# Patient Record
Sex: FEMALE | Race: BLACK OR AFRICAN AMERICAN | Employment: UNEMPLOYED | URBAN - METROPOLITAN AREA
[De-identification: names, ages, dates, MRNs, and addresses within clinical notes are randomized per-mention and may not be internally consistent; named-entity substitution may affect disease eponyms.]

---

## 2017-01-03 ENCOUNTER — PRENATAL OFFICE VISIT (OUTPATIENT)
Dept: OBGYN | Facility: CLINIC | Age: 32
End: 2017-01-03
Payer: COMMERCIAL

## 2017-01-03 VITALS
WEIGHT: 175.5 LBS | SYSTOLIC BLOOD PRESSURE: 100 MMHG | BODY MASS INDEX: 26.6 KG/M2 | DIASTOLIC BLOOD PRESSURE: 70 MMHG | HEIGHT: 68 IN | TEMPERATURE: 97.6 F

## 2017-01-03 DIAGNOSIS — Z30.016 ENCOUNTER FOR INITIAL PRESCRIPTION OF TRANSDERMAL PATCH HORMONAL CONTRACEPTIVE DEVICE: ICD-10-CM

## 2017-01-03 PROCEDURE — 99207 ZZC POST PARTUM EXAM: CPT | Performed by: FAMILY MEDICINE

## 2017-01-03 RX ORDER — NORELGESTROMIN AND ETHINYL ESTRADIOL 35; 150 UG/MG; UG/MG
1 PATCH TRANSDERMAL WEEKLY
Qty: 9 PATCH | Refills: 3 | Status: SHIPPED | OUTPATIENT
Start: 2017-01-03 | End: 2017-03-01

## 2017-01-03 NOTE — PROGRESS NOTES
SUBJECTIVE: Denzel is here for a 6-week postpartum checkup.    Delivery date was 2016. She had a  of a viable girl, weight 7 pounds 15.3 oz., with no complications.  Since delivery, she has been breast feeding.  She has no signs of infection, bleeding or other complications.  She is not pregnant.  We discussed contraceptions and she has chosen unsure.  She  has had intercourse since delivery and complains of No discomfort. Patient screened for postpartum depression and complaints are NEGATIVE. Screening has also been completed for intimate partner violence.    This document serves as a record of the services and decisions personally performed and made by Jessika James DO. It was created on his/her behalf by Ivis Reddy, a trained medical scribe. The creation of this document is based the provider's statements to the medical scribe.  Abbe Reddy 10:36 AM, January 3, 2017      EXAM:  Today's Depression Rating was   PHQ-9 SCORE 1/3/2017   Total Score -   Total Score 0       GENERAL healthy, alert and no distress  RESP: Clear to auscultation  BREASTS: NEGATIVE  CV: NEGATIVE  : NEGATIVE  GYN PELVIC: NEGATIVE, normal external genitalia, normal urethral meatus, normal vaginal mucosa, normal cervix and normal adnexa, no masses or tenderness  PSYCH: NEGATIVE    ASSESSMENT:   Normal postpartum exam after .    PLAN:  1) Rx for ortho evra patch  2) Return as needed or at time of next expected pap, pelvic, or breast exam.    The information in this document, created by the medical scribe for me, accurately reflects the services I personally performed and the decisions made by me. I have reviewed and approved this document for accuracy prior to leaving the patient care area.  Jessika James DO  10:36 AM, 2017    Dr. Jessika James DO    Obstetrics and Gynecology  [facility]

## 2017-01-03 NOTE — PATIENT INSTRUCTIONS
Return as needed.    Dr. Jessika James, DO    Obstetrics and Gynecology  [facility] [place]

## 2017-01-03 NOTE — MR AVS SNAPSHOT
"              After Visit Summary   1/3/2017    Denzel Tripp    MRN: [id number]           Patient Information     Date Of Birth          1985        Visit Information        Provider Department      1/3/2017 10:15 AM Dennis James DO; [employer] [facility]        Today's Diagnoses     Routine postpartum follow-up    -  1     Encounter for initial prescription of transdermal patch hormonal contraceptive device           Care Instructions    Return as needed.    Dr. Dennis James DO    Obstetrics and Gynecology  [facility] [place]               Follow-ups after your visit        Who to contact     If you have questions or need follow up information about today's clinic visit or your schedule please contact [facility] directly at [phone].  Normal or non-critical lab and imaging results will be communicated to you by Pluralityhart, letter or phone within 4 business days after the clinic has received the results. If you do not hear from us within 7 days, please contact the clinic through Pluralityhart or phone. If you have a critical or abnormal lab result, we will notify you by phone as soon as possible.  Submit refill requests through [email or web address] or call your pharmacy and they will forward the refill request to us. Please allow 3 business days for your refill to be completed.          Additional Information About Your Visit        Pluralityhart Information     [email or web address] lets you send messages to your doctor, view your test results, renew your prescriptions, schedule appointments and more. To sign up, go to [email or web address] . Click on \"Log in\" on the left side of the screen, which will take you to the Welcome page. Then click on \"Sign up Now\" on the right side of the page.     You will be asked to enter the access code listed below, as well as some personal information. Please follow the directions to create your username and password.   " "  Your access code is: [email or web address]  Expires: 3/13/2017 11:35 AM     Your access code will  in 90 days. If you need help or a new code, please call your [facility] or [phone].        Care EveryWhere ID     This is your Care EveryWhere ID. This could be used by other organizations to access your [facility] medical records  [id number]        Your Vitals Were     Temperature Height BMI (Body Mass Index) Last Period          97.6  F (36.4  C) (Oral) 5' 7.5\" (1.715 m) 27.07 kg/m2 10/01/2015         Blood Pressure from Last 3 Encounters:   17 100/70   16 110/72   16 100/61    Weight from Last 3 Encounters:   17 175 lb 8 oz (79.606 kg)   16 176 lb 9.6 oz (80.105 kg)   16 195 lb 1.6 oz (88.497 kg)              Today, you had the following     No orders found for display         Today's Medication Changes          These changes are accurate as of: 1/3/17 11:06 AM.  If you have any questions, ask your nurse or doctor.               Start taking these medicines.        Dose/Directions    norelgestromin-ethinyl estradiol 150-35 MCG/24HR patch   Commonly known as:  ORTHO EVRA   Used for:  Encounter for initial prescription of transdermal patch hormonal contraceptive device   Started by:  Jessika James DO        Dose:  1 patch   Place 1 patch onto the skin once a week   Quantity:  9 patch   Refills:  3            Where to get your medicines      These medications were sent to [id number] - [place] - [address]  AT [facility] & [place] [id number] [address] [facility] [place] [address]     Phone:  [phone]    - norelgestromin-ethinyl estradiol 150-35 MCG/24HR patch             Primary Care Provider Office Phone # Fax #    Indra Mark Wilkinson PA-C [phone]       [address] [address]        Thank you!     Thank you for choosing [facility]  for your care. Our goal " is always to provide you with excellent care. Hearing back from our patients is one way we can continue to improve our services. Please take a few minutes to complete the written survey that you may receive in the mail after your visit with us. Thank you!             Your Updated Medication List - Protect others around you: Learn how to safely use, store and throw away your medicines at www.disposemymeds.org.          This list is accurate as of: 1/3/17 11:06 AM.  Always use your most recent med list.                   Brand Name Dispense Instructions for use    acetaminophen 325 MG tablet    TYLENOL    100 tablet    Take 2 tablets (650 mg) by mouth every 4 hours as needed for mild pain or fever (greater than or equal to 38? C /100.4? F (oral) or 38.5? C/ 101.4? F (core).)       ibuprofen 200 MG tablet    ADVIL/MOTRIN     Take 3 tablets (600 mg) by mouth every 6 hours as needed for other (cramping)       norelgestromin-ethinyl estradiol 150-35 MCG/24HR patch    ORTHO EVRA    9 patch    Place 1 patch onto the skin once a week       PRENATAL VITAMIN PO      Take 1 tablet by mouth       ranitidine 300 MG tablet    ZANTAC    30 tablet    Take 1 tablet (300 mg) by mouth At Bedtime

## 2017-01-03 NOTE — NURSING NOTE
"Chief Complaint   Patient presents with     Post Partum Exam       Initial /70 mmHg  Temp(Src) 97.6  F (36.4  C) (Oral)  Ht 5' 7.5\" (1.715 m)  Wt 175 lb 8 oz (79.606 kg)  BMI 27.07 kg/m2  LMP 10/01/2015 Estimated body mass index is 27.07 kg/(m^2) as calculated from the following:    Height as of this encounter: 5' 7.5\" (1.715 m).    Weight as of this encounter: 175 lb 8 oz (79.606 kg).  BP completed using cuff size: regular  Zhane Ramirez MA    "

## 2017-01-04 ASSESSMENT — PATIENT HEALTH QUESTIONNAIRE - PHQ9: SUM OF ALL RESPONSES TO PHQ QUESTIONS 1-9: 0

## 2017-02-24 ENCOUNTER — OFFICE VISIT (OUTPATIENT)
Dept: FAMILY MEDICINE | Facility: CLINIC | Age: 32
End: 2017-02-24
Payer: COMMERCIAL

## 2017-02-24 VITALS
RESPIRATION RATE: 16 BRPM | TEMPERATURE: 98.2 F | HEART RATE: 85 BPM | DIASTOLIC BLOOD PRESSURE: 78 MMHG | BODY MASS INDEX: 27 KG/M2 | SYSTOLIC BLOOD PRESSURE: 119 MMHG | WEIGHT: 175 LBS

## 2017-02-24 DIAGNOSIS — R00.2 PALPITATIONS: Primary | ICD-10-CM

## 2017-02-24 DIAGNOSIS — K21.9 GASTROESOPHAGEAL REFLUX DISEASE, ESOPHAGITIS PRESENCE NOT SPECIFIED: ICD-10-CM

## 2017-02-24 PROCEDURE — 99214 OFFICE O/P EST MOD 30 MIN: CPT | Performed by: PHYSICIAN ASSISTANT

## 2017-02-24 PROCEDURE — 84443 ASSAY THYROID STIM HORMONE: CPT | Performed by: PHYSICIAN ASSISTANT

## 2017-02-24 PROCEDURE — 36415 COLL VENOUS BLD VENIPUNCTURE: CPT | Performed by: PHYSICIAN ASSISTANT

## 2017-02-24 NOTE — NURSING NOTE
"Chief Complaint   Patient presents with     Heart Problem     Initial /78 (BP Location: Right arm, Patient Position: Chair, Cuff Size: Adult Large)  Pulse 85  Temp 98.2  F (36.8  C) (Oral)  Resp 16  Wt 175 lb (79.4 kg)  BMI 27 kg/m2 Estimated body mass index is 27 kg/(m^2) as calculated from the following:    Height as of 1/3/17: 5' 7.5\" (1.715 m).    Weight as of this encounter: 175 lb (79.4 kg)..  BP completed using cuff size large-RA    "

## 2017-02-24 NOTE — PATIENT INSTRUCTIONS
"I am unsure what the cause of your continued palpitations are. It may be your thyroid and we will recheck this today. If this is abnormal, we will have you see a endocrinologist here at our clinic. If it is normal, then we can have you see a heart specialist. If everything is normal at the heart specialist, then we will consider having you see a psychologist to discuss possible anxiety.         * Heart Palpitations    Palpitations refers to the feeling that your heart is beating hard, fast or irregular. Some people describe it as \"pounding\" or \"skipped beats\". Palpitations may occur in persons with heart disease, but can also occur in healthy persons. Your doctor does not believe that anything dangerous is causing your symptoms at this time.  Heart-Related Causes:    Arrhythmia (a change from the heart's normal rhythm)    Disease of the heart valves  Non-Heart-Related Causes:    Certain medicines (such as asthma inhalers and decongestants)    Some herbal supplements, energy drinks and pills, and weight loss pills    Illegal stimulant drugs (such as cocaine, crank, methamphetamine, PCP)    Caffeine, alcohol and tobacco    Medical conditions such as thyroid disease, anemia, anxiety and panic disorder  Sometimes the cause cannot be found.  Home Care:  1. Avoid excess caffeine, alcohol, tobacco and any stimulant drugs.  2. Tell your doctor about any prescription or over-the-counter or herbal medicines you take.  Follow Up  with your doctor or as advised by our staff.  Get Prompt Medical Attention  if any of the following occur together with palpitations:    Weakness, dizziness, light-headed or fainting    Chest pain or shortness of breath    Rapid heart rate (over 120 beats per minute, at rest)    Palpitations that lasts over 20 minutes    Weakness of an arm or leg or one side of the face    Difficulty with speech or vision    4088-7885 [employer]. [address] [place] [address], [place] [address]. All rights " reserved. This information is not intended as a substitute for professional medical care. Always follow your healthcare professional's instructions.

## 2017-02-24 NOTE — MR AVS SNAPSHOT
"              After Visit Summary   2/24/2017    Denzel Tripp    MRN: [id number]           Patient Information     Date Of Birth          1985        Visit Information        Provider Department      2/24/2017 11:15 AM Open, Assignments; Indra Wilkinson PA-C [facility]        Today's Diagnoses     Palpitations    -  1    Gastroesophageal reflux disease, esophagitis presence not specified          Care Instructions    I am unsure what the cause of your continued palpitations are. It may be your thyroid and we will recheck this today. If this is abnormal, we will have you see a endocrinologist here at our clinic. If it is normal, then we can have you see a heart specialist. If everything is normal at the heart specialist, then we will consider having you see a psychologist to discuss possible anxiety.         * Heart Palpitations    Palpitations refers to the feeling that your heart is beating hard, fast or irregular. Some people describe it as \"pounding\" or \"skipped beats\". Palpitations may occur in persons with heart disease, but can also occur in healthy persons. Your doctor does not believe that anything dangerous is causing your symptoms at this time.  Heart-Related Causes:    Arrhythmia (a change from the heart's normal rhythm)    Disease of the heart valves  Non-Heart-Related Causes:    Certain medicines (such as asthma inhalers and decongestants)    Some herbal supplements, energy drinks and pills, and weight loss pills    Illegal stimulant drugs (such as cocaine, crank, methamphetamine, PCP)    Caffeine, alcohol and tobacco    Medical conditions such as thyroid disease, anemia, anxiety and panic disorder  Sometimes the cause cannot be found.  Home Care:  1. Avoid excess caffeine, alcohol, tobacco and any stimulant drugs.  2. Tell your doctor about any prescription or over-the-counter or herbal medicines you take.  Follow Up  with your doctor or as advised by our staff.  Get " "Prompt Medical Attention  if any of the following occur together with palpitations:    Weakness, dizziness, light-headed or fainting    Chest pain or shortness of breath    Rapid heart rate (over 120 beats per minute, at rest)    Palpitations that lasts over 20 minutes    Weakness of an arm or leg or one side of the face    Difficulty with speech or vision    0876-0980 [employer]. [address] [place] [address]. All rights reserved. This information is not intended as a substitute for professional medical care. Always follow your healthcare professional's instructions.              Follow-ups after your visit        Who to contact     If you have questions or need follow up information about today's clinic visit or your schedule please contact [facility] directly at [phone].  Normal or non-critical lab and imaging results will be communicated to you by MyChart, letter or phone within 4 business days after the clinic has received the results. If you do not hear from us within 7 days, please contact the clinic through MyChart or phone. If you have a critical or abnormal lab result, we will notify you by phone as soon as possible.  Submit refill requests through YoBucko or call your pharmacy and they will forward the refill request to us. Please allow 3 business days for your refill to be completed.          Additional Information About Your Visit        YoBucko Information     YoBucko lets you send messages to your doctor, view your test results, renew your prescriptions, schedule appointments and more. To sign up, go to [email or web address] . Click on \"Log in\" on the left side of the screen, which will take you to the Welcome page. Then click on \"Sign up Now\" on the right side of the page.     You will be asked to enter the access code listed below, as well as some personal information. Please follow the directions to create your username and password.     Your " access code is: [email or web address]  Expires: 3/13/2017 11:35 AM     Your access code will  in 90 days. If you need help or a new code, please call your [facility] or [phone].        Care EveryWhere ID     This is your Care EveryWhere ID. This could be used by other organizations to access your [facility] medical records  [id number]        Your Vitals Were     Pulse Temperature Respirations BMI (Body Mass Index)          85 98.2  F (36.8  C) (Oral) 16 27 kg/m2         Blood Pressure from Last 3 Encounters:   17 119/78   17 100/70   16 110/72    Weight from Last 3 Encounters:   17 175 lb (79.4 kg)   17 175 lb 8 oz (79.6 kg)   16 176 lb 9.6 oz (80.1 kg)              Today, you had the following     No orders found for display         Today's Medication Changes          These changes are accurate as of: 17 12:05 PM.  If you have any questions, ask your nurse or doctor.               Start taking these medicines.        Dose/Directions    omeprazole 20 MG CR capsule   Commonly known as:  priLOSEC   Used for:  Gastroesophageal reflux disease, esophagitis presence not specified   Started by:  Indra Wilkinson PA-C        Dose:  20 mg   Take 1 capsule (20 mg) by mouth daily   Quantity:  30 capsule   Refills:  1            Where to get your medicines      These medications were sent to [id number] - [place] - [address]  AT [facility] & [address] [place] [address]     Phone:  [phone]     omeprazole 20 MG CR capsule                Primary Care Provider Office Phone # Fax #    Indra Wilkinson PA-C [phone]       [facility] [address] [place] [address]        Thank you!     Thank you for choosing [facility]  for your care. Our goal is always to provide you with excellent care. Hearing back from our patients is one way we can continue to  improve our services. Please take a few minutes to complete the written survey that you may receive in the mail after your visit with us. Thank you!             Your Updated Medication List - Protect others around you: Learn how to safely use, store and throw away your medicines at www.disposemymeds.org.          This list is accurate as of: 2/24/17 12:05 PM.  Always use your most recent med list.                   Brand Name Dispense Instructions for use    acetaminophen 325 MG tablet    TYLENOL    100 tablet    Take 2 tablets (650 mg) by mouth every 4 hours as needed for mild pain or fever (greater than or equal to 38? C /100.4? F (oral) or 38.5? C/ 101.4? F (core).)       ibuprofen 200 MG tablet    ADVIL/MOTRIN     Take 3 tablets (600 mg) by mouth every 6 hours as needed for other (cramping)       norelgestromin-ethinyl estradiol 150-35 MCG/24HR patch    ORTHO EVRA    9 patch    Place 1 patch onto the skin once a week       omeprazole 20 MG CR capsule    priLOSEC    30 capsule    Take 1 capsule (20 mg) by mouth daily       PRENATAL VITAMIN PO      Take 1 tablet by mouth       ranitidine 300 MG tablet    ZANTAC    30 tablet    Take 1 tablet (300 mg) by mouth At Bedtime

## 2017-02-24 NOTE — PROGRESS NOTES
SUBJECTIVE:                                                    Denzel Tripp is a 31 year old female who presents to clinic today for the following health issues:      -heart racing x 1 week; shaking/vibration last night over heart last evening; worse at pm, but does happen during the day. Symptoms have not been present since last visit, but again returned this week. No chest pain or shortness of breath. No weight changes or temperature intolerance.  is here with patient and is interpreting for her. They would like a possible referral due to continued symptoms. Denies anxiety or stress. Did have child 4 months prior. Of note, ~1 year prior subclinical hyper thyroidism was evident, but upon retesting in December, this normalized. zio patch holter was normal last year as well.       GERD/Heartburn     Onset: 2 weeks ago. Then improved, but again worsened this week.     Description:     Burning in chest: YES    Intensity: moderate    Progression of Symptoms: intermittent    Accompanying Signs & Symptoms:  Does it feel like food gets stuck: no  Nausea: no  Vomiting (bloody?): no  Abdominal Pain: no  Black-Tarry stools: no:  Bloody stools: no   History:   Previous ulcers: no    Precipitating factors:   Caffeine use: YES- one cup of tea a day  Alcohol use: no  NSAID/Aspirin use: YES- started taking ibuprofen 1 month ago for 3 days. None since.   Tobacco use: no  Worse with spicy foods.    Alleviating factors:  None          Therapies Tried and outcome:none      Problem list and histories reviewed & adjusted, as indicated.  Additional history: as documented    Problem list, Medication list, Allergies, and Medical/Social/Surgical histories reviewed in EPIC and updated as appropriate.    ROS:  Constitutional, HEENT, psych, cardiovascular, pulmonary, gi and gu systems are negative, except as otherwise noted.    OBJECTIVE:                                                    /78 (BP Location: Right arm, Patient  Position: Chair, Cuff Size: Adult Large)  Pulse 85  Temp 98.2  F (36.8  C) (Oral)  Resp 16  Wt 175 lb (79.4 kg)  BMI 27 kg/m2  Body mass index is 27 kg/(m^2).  GENERAL: healthy, alert and no distress  NECK: no adenopathy, no asymmetry, masses, or scars and thyroid normal to palpation  RESP: lungs clear to auscultation - no rales, rhonchi or wheezes  CV: regular rates and rhythm, normal S1 S2, no S3 or S4 and no murmur, click or rub  ABDOMEN: soft, nontender, no hepatosplenomegaly, no masses and bowel sounds normal  PSYCH: mentation appears normal, affect normal/bright    Diagnostic Test Results:  none      ASSESSMENT/PLAN:                                                      (R00.2) Palpitations  (primary encounter diagnosis)  Comment: no palpitations on exam, but does not have symptoms currently. Does have history of subclinical hyperthyroidism and will recheck this today, but did discuss with patient and  this may be stress induced. Patient would prefer to see a cardiologist. Will refer for second opinion, but also discussed about possible psychology management, which she agrees will be a possible option.   Plan: TSH with free T4 reflex            (K21.9) Gastroesophageal reflux disease, esophagitis presence not specified  Comment: evident on history and exam. Discussed avoiding acid foods, nsaids, and peppermint. Does not drink alcohol. Will tx with 2-4 weeks of omeprazole, which should manage symptoms. If symptoms continue, will consider zantac as possible alternative for chronic use.   Plan: omeprazole (PRILOSEC) 20 MG CR capsule        -Medication use and side effects discussed with the patient. Patient is in complete understanding and agreement with plan.         Follow up: as above     Indra Wilkinson PA-C  [facility]

## 2017-02-25 LAB — TSH SERPL DL<=0.005 MIU/L-ACNC: 0.69 MU/L (ref 0.4–4)

## 2017-02-28 ENCOUNTER — TELEPHONE (OUTPATIENT)
Dept: FAMILY MEDICINE | Facility: CLINIC | Age: 32
End: 2017-02-28

## 2017-02-28 DIAGNOSIS — R00.2 PALPITATIONS: Primary | ICD-10-CM

## 2017-02-28 NOTE — TELEPHONE ENCOUNTER
Please call patient. Her thyroid function was normal. I have placed the cardiology referral. Seeing them is the next step    [facility]: [facility] [phone]   [email or web address]    Thanks,    Kellie Ordaz

## 2017-03-01 ENCOUNTER — HOSPITAL ENCOUNTER (EMERGENCY)
Facility: CLINIC | Age: 32
Discharge: HOME | End: 2017-03-01
Attending: EMERGENCY MEDICINE | Admitting: EMERGENCY MEDICINE
Payer: COMMERCIAL

## 2017-03-01 ENCOUNTER — OFFICE VISIT (OUTPATIENT)
Dept: CARDIOLOGY | Facility: CLINIC | Age: 32
End: 2017-03-01
Attending: PHYSICIAN ASSISTANT
Payer: COMMERCIAL

## 2017-03-01 VITALS
RESPIRATION RATE: 18 BRPM | TEMPERATURE: 97.5 F | OXYGEN SATURATION: 99 % | HEART RATE: 75 BPM | DIASTOLIC BLOOD PRESSURE: 62 MMHG | SYSTOLIC BLOOD PRESSURE: 98 MMHG

## 2017-03-01 VITALS
DIASTOLIC BLOOD PRESSURE: 80 MMHG | BODY MASS INDEX: 26.37 KG/M2 | HEIGHT: 68 IN | HEART RATE: 84 BPM | WEIGHT: 174 LBS | SYSTOLIC BLOOD PRESSURE: 108 MMHG

## 2017-03-01 DIAGNOSIS — K21.9 GASTROESOPHAGEAL REFLUX DISEASE, ESOPHAGITIS PRESENCE NOT SPECIFIED: ICD-10-CM

## 2017-03-01 DIAGNOSIS — R00.2 PALPITATIONS: ICD-10-CM

## 2017-03-01 DIAGNOSIS — K21.9 GASTROESOPHAGEAL REFLUX DISEASE WITHOUT ESOPHAGITIS: ICD-10-CM

## 2017-03-01 DIAGNOSIS — R00.2 PALPITATIONS: Primary | ICD-10-CM

## 2017-03-01 LAB
ALBUMIN SERPL-MCNC: 3.9 G/DL (ref 3.4–5)
ALP SERPL-CCNC: 73 U/L (ref 40–150)
ALT SERPL W P-5'-P-CCNC: 25 U/L (ref 0–50)
ANION GAP SERPL CALCULATED.3IONS-SCNC: 10 MMOL/L (ref 3–14)
AST SERPL W P-5'-P-CCNC: 20 U/L (ref 0–45)
BASOPHILS # BLD AUTO: 0 10E9/L (ref 0–0.2)
BASOPHILS NFR BLD AUTO: 0.3 %
BILIRUB SERPL-MCNC: 0.4 MG/DL (ref 0.2–1.3)
BUN SERPL-MCNC: 5 MG/DL (ref 7–30)
CALCIUM SERPL-MCNC: 9 MG/DL (ref 8.5–10.1)
CHLORIDE SERPL-SCNC: 103 MMOL/L (ref 94–109)
CO2 SERPL-SCNC: 26 MMOL/L (ref 20–32)
CREAT SERPL-MCNC: 0.58 MG/DL (ref 0.52–1.04)
DIFFERENTIAL METHOD BLD: NORMAL
EOSINOPHIL # BLD AUTO: 0.1 10E9/L (ref 0–0.7)
EOSINOPHIL NFR BLD AUTO: 1 %
ERYTHROCYTE [DISTWIDTH] IN BLOOD BY AUTOMATED COUNT: 12.6 % (ref 10–15)
GFR SERPL CREATININE-BSD FRML MDRD: ABNORMAL ML/MIN/1.7M2
GLUCOSE SERPL-MCNC: 106 MG/DL (ref 70–99)
HCT VFR BLD AUTO: 39.2 % (ref 35–47)
HGB BLD-MCNC: 13.2 G/DL (ref 11.7–15.7)
IMM GRANULOCYTES # BLD: 0 10E9/L (ref 0–0.4)
IMM GRANULOCYTES NFR BLD: 0.1 %
INTERPRETATION ECG - MUSE: NORMAL
LIPASE SERPL-CCNC: 325 U/L (ref 73–393)
LYMPHOCYTES # BLD AUTO: 3.4 10E9/L (ref 0.8–5.3)
LYMPHOCYTES NFR BLD AUTO: 49.4 %
MCH RBC QN AUTO: 30.4 PG (ref 26.5–33)
MCHC RBC AUTO-ENTMCNC: 33.7 G/DL (ref 31.5–36.5)
MCV RBC AUTO: 90 FL (ref 78–100)
MONOCYTES # BLD AUTO: 0.6 10E9/L (ref 0–1.3)
MONOCYTES NFR BLD AUTO: 9 %
NEUTROPHILS # BLD AUTO: 2.8 10E9/L (ref 1.6–8.3)
NEUTROPHILS NFR BLD AUTO: 40.2 %
NRBC # BLD AUTO: 0 10*3/UL
NRBC BLD AUTO-RTO: 0 /100
PLATELET # BLD AUTO: 328 10E9/L (ref 150–450)
POTASSIUM SERPL-SCNC: 3.2 MMOL/L (ref 3.4–5.3)
PROT SERPL-MCNC: 8 G/DL (ref 6.8–8.8)
RBC # BLD AUTO: 4.34 10E12/L (ref 3.8–5.2)
SODIUM SERPL-SCNC: 139 MMOL/L (ref 133–144)
TROPONIN I SERPL-MCNC: NORMAL UG/L (ref 0–0.04)
WBC # BLD AUTO: 6.9 10E9/L (ref 4–11)

## 2017-03-01 PROCEDURE — 80053 COMPREHEN METABOLIC PANEL: CPT | Performed by: EMERGENCY MEDICINE

## 2017-03-01 PROCEDURE — 93005 ELECTROCARDIOGRAM TRACING: CPT

## 2017-03-01 PROCEDURE — 84484 ASSAY OF TROPONIN QUANT: CPT | Performed by: EMERGENCY MEDICINE

## 2017-03-01 PROCEDURE — 99204 OFFICE O/P NEW MOD 45 MIN: CPT | Performed by: INTERNAL MEDICINE

## 2017-03-01 PROCEDURE — 85025 COMPLETE CBC W/AUTO DIFF WBC: CPT | Performed by: EMERGENCY MEDICINE

## 2017-03-01 PROCEDURE — 99285 EMERGENCY DEPT VISIT HI MDM: CPT

## 2017-03-01 PROCEDURE — 25000128 H RX IP 250 OP 636: Performed by: EMERGENCY MEDICINE

## 2017-03-01 PROCEDURE — 83690 ASSAY OF LIPASE: CPT | Performed by: EMERGENCY MEDICINE

## 2017-03-01 PROCEDURE — 96374 THER/PROPH/DIAG INJ IV PUSH: CPT

## 2017-03-01 RX ORDER — LIDOCAINE 40 MG/G
CREAM TOPICAL
Status: DISCONTINUED | OUTPATIENT
Start: 2017-03-01 | End: 2017-03-01 | Stop reason: HOSPADM

## 2017-03-01 RX ORDER — LORAZEPAM 2 MG/ML
1 INJECTION INTRAMUSCULAR ONCE
Status: COMPLETED | OUTPATIENT
Start: 2017-03-01 | End: 2017-03-01

## 2017-03-01 RX ADMIN — LORAZEPAM 1 MG: 2 INJECTION INTRAMUSCULAR; INTRAVENOUS at 01:54

## 2017-03-01 ASSESSMENT — ENCOUNTER SYMPTOMS
PALPITATIONS: 1
VOMITING: 0
NAUSEA: 0

## 2017-03-01 NOTE — ED NOTES
"Pt c/o substernal chest pain that has been going on for a few weeks. Pt saw primary doctor, who did a full workup and set pt up with a cardiologist. Pt has appt with cardiologist tomorrow morning. Pt denies SOB, but states the chest pain feels like \"burning\". Pt alert and oriented, ABCs intact.   "

## 2017-03-01 NOTE — ED PROVIDER NOTES
History   Chief Complaint:  Chest Pain     HPI   History is obtained from      Denzel Tripp is a 31 year old female who presents to the emergency department for evaluation of heart palpitations. Patient reports that she has had palpitations for the past several days and followed up at primary care and was scheduled to see a cardiologist which the appointment is later today. In addition, patient is describing epigastric pain and burning sensation. Her TSH levels were checked at primary care which were normal and she was started on Omeprazole. Patient notes that she has had difficulty sleeping for the past several days due to the palpitations.     Allergies:  Blood Transfusion Related     Medications:    Omeprazole   Ortho Evra  Tylenol   Ibuprofen   Zantac   Prenatal vitamins     Past Medical History:    The patient denies any significant past medical history.    Past Surgical History:    Revise circumcision     Family History:    No past pertinent family history.    Social History:  Negative for tobacco use.  Negative for alcohol use.  Presents to ED accompanied by significant other   Marital Status:       Review of Systems   Cardiovascular: Positive for chest pain and palpitations.   Gastrointestinal: Negative for nausea and vomiting.   All other systems reviewed and are negative.    Physical Exam   Vitals:  Patient Vitals for the past 24 hrs:   BP Temp Temp src Pulse Heart Rate Resp SpO2   03/01/17 0345 98/62 - - 75 75 18 99 %   03/01/17 0313 100/67 - - - 81 18 99 %   03/01/17 0227 115/74 - - - 68 - 99 %   03/01/17 0154 102/70 - - - 72 - 99 %   03/01/17 0131 117/80 97.5  F (36.4  C) Oral 89 89 18 100 %      Physical Exam  General: Patient is alert and cooperative.  HENT:  No nasal congestion or drainage.   Eyes: EOMI, PERRLA.  Normal conjunctiva.  Neck: Normal range of motion. Neck supple.  Cardiovascular: Normal rate, regular rhythm and normal heart sounds.   Pulmonary/Chest: Effort normal.  No  wheezing or crackles.  No chest wall tenderness.   Abdominal: Soft. Patient exhibits no distension and no mass. There is no tenderness.        Musculoskeletal: Normal range of motion. Patient exhibits no edema and no tenderness.   Neurological: Patient  is alert and oriented. Coordination normal, grossly intact.  Skin: Skin is warm and dry. No rash noted.   Psychiatric: Patient has a normal mood and affect. Normal behavior and judgement.      Emergency Department Course   ECG:  Indication: Chest pain   Time: 0209  Vent. Rate 74 bpm. RI interval 180. QRS duration 102. QT/QTc 352/390. P-R-T axis 50 74 29. Normal sinus rhythm with sinus arrhythmia, normal ECG Read time: 0215    Laboratory:  CBC: WBC: 6.9, HGB: 13.2, PLT: 328  CMP: Glucose 106(H), Potassium 3.2(L), BUN 5(L), o/w WNL (Creatinine: 0.58)    Lipase: 325  Troponin<0.015    Interventions:  0145 Ativan 1 mg IV     Emergency Department Course:  IV was inserted and blood was drawn for laboratory testing, results above.  EKG obtained in the ED, see results above.      I discussed the treatment plan with the patient. They expressed understanding of this plan and consented to discharge. They will be discharged home with instructions for care and follow up. In addition, the patient will return to the emergency department if their symptoms persist, worsen, if new symptoms arise or if there is any concern.  All questions were answered.    Impression & Plan    Medical Decision Making:  Denzel Tripp is a 31 year old female arrives with her spouse for evaluation primarily for palpitations and some difficulty sleeping. She has had several days of these symptoms for which she has been seen by her primary care provider and actually been referred for cardiology consult later today. She has had some associated epigastric burning discomfort but no true chest pain or dyspnea. Outside work up appears to have been limited to a negative TSH. She is not known to have any  significant medical problems and has a normal physical exam here. She has normal blood pressure, heart rate, cardiopulmonary exam and work up included electrocardiogram which showed sinus rhythm with normal intervals no sign of ischemia. Screening laboratory work up was also unremarkable including an undetectable troponin, normal lipase and normal liver functions. I do not believe chest radiography is necessary. These are essentially palpitations with no associated true chest pain or dyspnea. She exhibited a sinus rhythm throughout her stay here and did respond to IV Ativan which was given to help her relax. I have done my best to reassure patient and her spouse and believe she is stable for discharge home and see the cardiologist later today. A suspicion for a true cardiac dysrhythmia is quite low and there is no concern for an ischemic process, infection or pulmonary embolism.     Diagnosis:    ICD-10-CM    1. Palpitations R00.2      Susannah Said  3/1/2017   [facility] EMERGENCY DEPARTMENT    ISusannah Said, am serving as a scribe on 3/1/2017 at 1:36 AM to personally document services performed by Montana Otoole MD based on my observations and the provider's statements to me.        Montana Otoole MD  03/02/17 0888

## 2017-03-01 NOTE — DISCHARGE INSTRUCTIONS
"  * Heart Palpitations    Palpitations refers to the feeling that your heart is beating hard, fast or irregular. Some people describe it as \"pounding\" or \"skipped beats\". Palpitations may occur in persons with heart disease, but can also occur in healthy persons. Your doctor does not believe that anything dangerous is causing your symptoms at this time.  Heart-Related Causes:    Arrhythmia (a change from the heart's normal rhythm)    Disease of the heart valves  Non-Heart-Related Causes:    Certain medicines (such as asthma inhalers and decongestants)    Some herbal supplements, energy drinks and pills, and weight loss pills    Illegal stimulant drugs (such as cocaine, crank, methamphetamine, PCP)    Caffeine, alcohol and tobacco    Medical conditions such as thyroid disease, anemia, anxiety and panic disorder  Sometimes the cause cannot be found.  Home Care:  1. Avoid excess caffeine, alcohol, tobacco and any stimulant drugs.  2. Tell your doctor about any prescription or over-the-counter or herbal medicines you take.  Follow Up  with your doctor or as advised by our staff.  Get Prompt Medical Attention  if any of the following occur together with palpitations:    Weakness, dizziness, light-headed or fainting    Chest pain or shortness of breath    Rapid heart rate (over 120 beats per minute, at rest)    Palpitations that lasts over 20 minutes    Weakness of an arm or leg or one side of the face    Difficulty with speech or vision    0242-2309 [employer]. [address] [place] [address]. All rights reserved. This information is not intended as a substitute for professional medical care. Always follow your healthcare professional's instructions.        GERD (Adult)    The esophagus is a tube that carries food from the mouth to the stomach. A valve at the lower end of the esophagus prevents stomach acid from flowing upward. When this valve doesn't work properly, stomach contents may " "repeatedly flow back up (reflux) into the esophagus. This is called gastroesophageal reflux disease (GERD). GERD can irritate the esophagus. It can cause problems with swallowing or breathing. In severe cases, GERD can cause recurrent pneumonia or other serious problems.  Symptoms of reflux include burning, pressure or sharp pain in the upper abdomen or mid to lower chest. The pain can spread to the neck, back, or shoulder. There may be belching, an acid taste in the back of the throat, chronic cough, or sore throat or hoarseness. GERD symptoms often occur during the day after a big meal. They can also occur at night when lying down.   Home care  Lifestyle changes can help reduce symptoms. If needed, medicines may be prescribed. Symptoms often improve with treatment, but if treatment is stopped, the symptoms often return after a few months. So most persons with GERD will need to continue treatment.  Lifestyle changes    Limit or avoid fatty, fried, and spicy foods, as well as coffee, chocolate, mint, and foods with high acid content such as tomatoes and citrus fruit and juices (orange, grapefruit, lemon).    Don t eat large meals, especially at night. Frequent, smaller meals are best. Do not lie down right after eating. And don t eat anything 3 hours before going to bed.    Avoid drinking alcohol and smoking. As much as possible, stay away from second hand smoke.    If you are overweight, losing weight will reduce symptoms.     Avoid wearing tight clothing around your stomach area.    If your symptoms occur during sleep, use a foam wedge to elevate your upper body (not just your head.) Or, place 4\" blocks under the head of your bed.  Medicines  If needed, medicines can help relieve the symptoms of GERD and prevent damage to the esophagus. Discuss a medicine plan with your healthcare provider. This may include one or more of the following medicines:    Antacids to help neutralize the normal acids in your " stomach.    Acid blockers (H2 blockers) to decrease acid production.    Acid inhibitors (PPIs) to decrease acid production in a different way than the blockers. They may work better, but can take a little longer to take effect.  Take an antacid 30-60 minutes after eating and at bedtime, but not at the same time as an acid blocker.  Try not to take medicines such as ibuprofen and aspirin. If you are taking aspirin for your heart or other medical reasons, talk to your healthcare provider about stopping it.  Follow-up care  Follow up with your healthcare provider or as advised by our staff.  When to seek medical advice  Call your healthcare provider if any of the following occur:    Stomach pain gets worse or moves to the lower right abdomen (appendix area)    Chest pain appears or gets worse, or spreads to the back, neck, shoulder, or arm    Frequent vomiting (can t keep down liquids)    Blood in the stool or vomit (red or black in color)    Feeling weak or dizzy    Fever of 100.4 F (38 C) or higher, or as directed by your healthcare provider    1693-2022 [employer]. [address] [place] [address], [place] [address]. All rights reserved. This information is not intended as a substitute for professional medical care. Always follow your healthcare professional's instructions.

## 2017-03-01 NOTE — ED AVS SNAPSHOT
" [facility] Emergency Department    [address]    [facility] [address]    Phone:  [phone]    Fax:  [phone]                                       Denzel Tripp   MRN: [id number]    Department:  [facility] Emergency Department   Date of Visit:  3/1/2017           Patient Information     Date Of Birth          1985        Your diagnoses for this visit were:     Palpitations        You were seen by Montana Otoole MD.      Follow-up Information     Follow up with Indra Wilkinson PA-C.    Specialty:  Physician Assistant    Why:  for re-evaluation of your symptoms as needed after your specialist visit    Contact information:    [facility]  [id number] [place] [id number] [phone]          Discharge Instructions         * Heart Palpitations    Palpitations refers to the feeling that your heart is beating hard, fast or irregular. Some people describe it as \"pounding\" or \"skipped beats\". Palpitations may occur in persons with heart disease, but can also occur in healthy persons. Your doctor does not believe that anything dangerous is causing your symptoms at this time.  Heart-Related Causes:    Arrhythmia (a change from the heart's normal rhythm)    Disease of the heart valves  Non-Heart-Related Causes:    Certain medicines (such as asthma inhalers and decongestants)    Some herbal supplements, energy drinks and pills, and weight loss pills    Illegal stimulant drugs (such as cocaine, crank, methamphetamine, PCP)    Caffeine, alcohol and tobacco    Medical conditions such as thyroid disease, anemia, anxiety and panic disorder  Sometimes the cause cannot be found.  Home Care:  1. Avoid excess caffeine, alcohol, tobacco and any stimulant drugs.  2. Tell your doctor about any prescription or over-the-counter or herbal medicines you take.  Follow Up  with your doctor or as advised by our staff.  Get Prompt Medical Attention  if any of " the following occur together with palpitations:    Weakness, dizziness, light-headed or fainting    Chest pain or shortness of breath    Rapid heart rate (over 120 beats per minute, at rest)    Palpitations that lasts over 20 minutes    Weakness of an arm or leg or one side of the face    Difficulty with speech or vision    9465-6838 [employer]. [address] [place] [address]. All rights reserved. This information is not intended as a substitute for professional medical care. Always follow your healthcare professional's instructions.        GERD (Adult)    The esophagus is a tube that carries food from the mouth to the stomach. A valve at the lower end of the esophagus prevents stomach acid from flowing upward. When this valve doesn't work properly, stomach contents may repeatedly flow back up (reflux) into the esophagus. This is called gastroesophageal reflux disease (GERD). GERD can irritate the esophagus. It can cause problems with swallowing or breathing. In severe cases, GERD can cause recurrent pneumonia or other serious problems.  Symptoms of reflux include burning, pressure or sharp pain in the upper abdomen or mid to lower chest. The pain can spread to the neck, back, or shoulder. There may be belching, an acid taste in the back of the throat, chronic cough, or sore throat or hoarseness. GERD symptoms often occur during the day after a big meal. They can also occur at night when lying down.   Home care  Lifestyle changes can help reduce symptoms. If needed, medicines may be prescribed. Symptoms often improve with treatment, but if treatment is stopped, the symptoms often return after a few months. So most persons with GERD will need to continue treatment.  Lifestyle changes    Limit or avoid fatty, fried, and spicy foods, as well as coffee, chocolate, mint, and foods with high acid content such as tomatoes and citrus fruit and juices (orange, grapefruit, lemon).    Don t eat large  "meals, especially at night. Frequent, smaller meals are best. Do not lie down right after eating. And don t eat anything 3 hours before going to bed.    Avoid drinking alcohol and smoking. As much as possible, stay away from second hand smoke.    If you are overweight, losing weight will reduce symptoms.     Avoid wearing tight clothing around your stomach area.    If your symptoms occur during sleep, use a foam wedge to elevate your upper body (not just your head.) Or, place 4\" blocks under the head of your bed.  Medicines  If needed, medicines can help relieve the symptoms of GERD and prevent damage to the esophagus. Discuss a medicine plan with your healthcare provider. This may include one or more of the following medicines:    Antacids to help neutralize the normal acids in your stomach.    Acid blockers (H2 blockers) to decrease acid production.    Acid inhibitors (PPIs) to decrease acid production in a different way than the blockers. They may work better, but can take a little longer to take effect.  Take an antacid 30-60 minutes after eating and at bedtime, but not at the same time as an acid blocker.  Try not to take medicines such as ibuprofen and aspirin. If you are taking aspirin for your heart or other medical reasons, talk to your healthcare provider about stopping it.  Follow-up care  Follow up with your healthcare provider or as advised by our staff.  When to seek medical advice  Call your healthcare provider if any of the following occur:    Stomach pain gets worse or moves to the lower right abdomen (appendix area)    Chest pain appears or gets worse, or spreads to the back, neck, shoulder, or arm    Frequent vomiting (can t keep down liquids)    Blood in the stool or vomit (red or black in color)    Feeling weak or dizzy    Fever of 100.4 F (38 C) or higher, or as directed by your healthcare provider    7292-3820 [employer]. [address] [place] [address], [place] [address]. All rights " reserved. This information is not intended as a substitute for professional medical care. Always follow your healthcare professional's instructions.          Future Appointments        Provider Department Dept Phone Center    3/1/2017 8:30 AM Casey Haynes MD; Milad Rosen [facility] [place] [phone] [facility]      24 Hour Appointment Hotline       To make an appointment at any [facility], call [phone] ([phone]). If you don't have a family doctor or clinic, we will help you find one. [facility] clinics are conveniently located to serve the needs of you and your family.             Review of your medicines      Our records show that you are taking the medicines listed below. If these are incorrect, please call your family doctor or clinic.        Dose / Directions Last dose taken    acetaminophen 325 MG tablet   Commonly known as:  TYLENOL   Dose:  650 mg   Quantity:  100 tablet        Take 2 tablets (650 mg) by mouth every 4 hours as needed for mild pain or fever (greater than or equal to 38? C /100.4? F (oral) or 38.5? C/ 101.4? F (core).)   Refills:  0        ibuprofen 200 MG tablet   Commonly known as:  ADVIL/MOTRIN   Dose:  600 mg        Take 3 tablets (600 mg) by mouth every 6 hours as needed for other (cramping)   Refills:  0        norelgestromin-ethinyl estradiol 150-35 MCG/24HR patch   Commonly known as:  ORTHO EVRA   Dose:  1 patch   Quantity:  9 patch        Place 1 patch onto the skin once a week   Refills:  3        omeprazole 20 MG CR capsule   Commonly known as:  priLOSEC   Dose:  20 mg   Quantity:  30 capsule        Take 1 capsule (20 mg) by mouth daily   Refills:  1        PRENATAL VITAMIN PO   Dose:  1 tablet        Take 1 tablet by mouth   Refills:  0        ranitidine 300 MG tablet   Commonly known as:  ZANTAC   Dose:  300 mg   Quantity:  30 tablet        Take 1 tablet (300 mg) by mouth At Bedtime   Refills:  1                Procedures  and tests performed during your visit     CBC with platelets differential    Comprehensive metabolic panel    EKG 12-lead, tracing only    Lipase    Troponin I (now)      Orders Needing Specimen Collection     None      Pending Results     Date and Time Order Name Status Description    3/1/2017 0149 EKG 12-lead, tracing only Preliminary             Pending Culture Results     No orders found from 2/27/2017 to 3/2/2017.             Test Results from your hospital stay     3/1/2017  2:02 AM - Interface, Flexilab Results      Component Results     Component Value Ref Range & Units Status    WBC 6.9 4.0 - 11.0 10e9/L Final    RBC Count 4.34 3.8 - 5.2 10e12/L Final    Hemoglobin 13.2 11.7 - 15.7 g/dL Final    Hematocrit 39.2 35.0 - 47.0 % Final    MCV 90 78 - 100 fl Final    MCH 30.4 26.5 - 33.0 pg Final    MCHC 33.7 31.5 - 36.5 g/dL Final    RDW 12.6 10.0 - 15.0 % Final    Platelet Count 328 150 - 450 10e9/L Final    Diff Method Automated Method  Final    % Neutrophils 40.2 % Final    % Lymphocytes 49.4 % Final    % Monocytes 9.0 % Final    % Eosinophils 1.0 % Final    % Basophils 0.3 % Final    % Immature Granulocytes 0.1 % Final    Nucleated RBCs 0 0 /100 Final    Absolute Neutrophil 2.8 1.6 - 8.3 10e9/L Final    Absolute Lymphocytes 3.4 0.8 - 5.3 10e9/L Final    Absolute Monocytes 0.6 0.0 - 1.3 10e9/L Final    Absolute Eosinophils 0.1 0.0 - 0.7 10e9/L Final    Absolute Basophils 0.0 0.0 - 0.2 10e9/L Final    Abs Immature Granulocytes 0.0 0 - 0.4 10e9/L Final    Absolute Nucleated RBC 0.0  Final         3/1/2017  2:22 AM - Interface, Flexilab Results      Component Results     Component Value Ref Range & Units Status    Sodium 139 133 - 144 mmol/L Final    Potassium 3.2 (L) 3.4 - 5.3 mmol/L Final    Chloride 103 94 - 109 mmol/L Final    Carbon Dioxide 26 20 - 32 mmol/L Final    Anion Gap 10 3 - 14 mmol/L Final    Glucose 106 (H) 70 - 99 mg/dL Final    Urea Nitrogen 5 (L) 7 - 30 mg/dL Final    Creatinine 0.58 0.52 -  1.04 mg/dL Final    GFR Estimate >90  Non  GFR Calc   >60 mL/min/1.7m2 Final    GFR Estimate If Black >90   GFR Calc   >60 mL/min/1.7m2 Final    Calcium 9.0 8.5 - 10.1 mg/dL Final    Bilirubin Total 0.4 0.2 - 1.3 mg/dL Final    Albumin 3.9 3.4 - 5.0 g/dL Final    Protein Total 8.0 6.8 - 8.8 g/dL Final    Alkaline Phosphatase 73 40 - 150 U/L Final    ALT 25 0 - 50 U/L Final    AST 20 0 - 45 U/L Final         3/1/2017  2:22 AM - Interface, Flexilab Results      Component Results     Component Value Ref Range & Units Status    Lipase 325 73 - 393 U/L Final         3/1/2017  3:20 AM - Interface, Flexilab Results      Component Results     Component Value Ref Range & Units Status    Troponin I ES  0.000 - 0.045 ug/L Final    <0.015  The 99th percentile for upper reference range is 0.045 ug/L.  Troponin values in   the range of 0.045 - 0.120 ug/L may be associated with risks of adverse   clinical events.                  Clinical Quality Measure: Blood Pressure Screening     Your blood pressure was checked while you were in the emergency department today. The last reading we obtained was  BP: 100/67 . Please read the guidelines below about what these numbers mean and what you should do about them.  If your systolic blood pressure (the top number) is less than 120 and your diastolic blood pressure (the bottom number) is less than 80, then your blood pressure is normal. There is nothing more that you need to do about it.  If your systolic blood pressure (the top number) is 120-139 or your diastolic blood pressure (the bottom number) is 80-89, your blood pressure may be higher than it should be. You should have your blood pressure rechecked within a year by a primary care provider.  If your systolic blood pressure (the top number) is 140 or greater or your diastolic blood pressure (the bottom number) is 90 or greater, you may have high blood pressure. High blood pressure is treatable, but if  "left untreated over time it can put you at risk for heart attack, stroke, or kidney failure. You should have your blood pressure rechecked by a primary care provider within the next 4 weeks.  If your provider in the emergency department today gave you specific instructions to follow-up with your doctor or provider even sooner than that, you should follow that instruction and not wait for up to 4 weeks for your follow-up visit.        Thank you for choosing [facility]       Thank you for choosing [facility] for your care. Our goal is always to provide you with excellent care. Hearing back from our patients is one way we can continue to improve our services. Please take a few minutes to complete the written survey that you may receive in the mail after you visit with us. Thank you!        [facility] Information     [facility] lets you send messages to your doctor, view your test results, renew your prescriptions, schedule appointments and more. To sign up, go to [email or web address] . Click on \"Log in\" on the left side of the screen, which will take you to the Welcome page. Then click on \"Sign up Now\" on the right side of the page.     You will be asked to enter the access code listed below, as well as some personal information. Please follow the directions to create your username and password.     Your access code is: [email or web address]  Expires: 3/13/2017 11:35 AM     Your access code will  in 90 days. If you need help or a new code, please call your [facility] clinic or [phone].        Care EveryWhere ID     This is your Care EveryWhere ID. This could be used by other organizations to access your [facility] medical records  [id number]        After Visit Summary       This is your record. Keep this with you and show to your community pharmacist(s) and doctor(s) at your next visit.                  "

## 2017-03-01 NOTE — ED AVS SNAPSHOT
[facility] Emergency Department    [address]    [facility] [address]    Phone:  [phone]    Fax:  [phone]                                       Denzel Tripp   MRN: [id number]    Department:  [facility] Emergency Department   Date of Visit:  3/1/2017           After Visit Summary Signature Page     I have received my discharge instructions, and my questions have been answered. I have discussed any challenges I see with this plan with the nurse or doctor.    ..........................................................................................................................................  Patient/Patient Representative Signature      ..........................................................................................................................................  Patient Representative Print Name and Relationship to Patient    ..................................................               ................................................  Date                                            Time    ..........................................................................................................................................  Reviewed by Signature/Title    ...................................................              ..............................................  Date                                                            Time

## 2017-03-01 NOTE — MR AVS SNAPSHOT
After Visit Summary   3/1/2017    Denzel Tripp    MRN: [id number]           Patient Information     Date Of Birth          1985        Visit Information        Provider Department      3/1/2017 8:30 AM Casey Haynes MD; [employer] [facility] HEART [facility]        Today's Diagnoses     Palpitations    -  1    Gastroesophageal reflux disease without esophagitis        Gastroesophageal reflux disease, esophagitis presence not specified           Follow-ups after your visit        Additional Services     Follow-Up with Cardiac Advanced Practice Provider                 Future tests that were ordered for you today     Open Future Orders        Priority Expected Expires Ordered    Holter Monitor 24 hour - Adult Routine 3/8/2017 3/1/2018 3/1/2017    Echocardiogram Routine 3/8/2017 3/1/2018 3/1/2017    Follow-Up with Cardiac Advanced Practice Provider Routine 3/15/2017 3/1/2018 3/1/2017            Who to contact     If you have questions or need follow up information about today's clinic visit or your schedule please contact [facility] directly at [phone].  Normal or non-critical lab and imaging results will be communicated to you by MyChart, letter or phone within 4 business days after the clinic has received the results. If you do not hear from us within 7 days, please contact the clinic through [employer] or phone. If you have a critical or abnormal lab result, we will notify you by phone as soon as possible.  Submit refill requests through [employer] or call your pharmacy and they will forward the refill request to us. Please allow 3 business days for your refill to be completed.          Additional Information About Your Visit        MyChart Information     [employer] lets you send messages to your doctor, view your test results, renew your prescriptions, schedule appointments and more. To sign up, go to  [email or web address] . Click on \"Log in\" on the left side of the screen, which will take you to the Welcome page. Then click on \"Sign up Now\" on the right side of the page.     You will be asked to enter the access code listed below, as well as some personal information. Please follow the directions to create your username and password.     Your access code is: [email or web address]  Expires: 3/13/2017 11:35 AM     Your access code will  in 90 days. If you need help or a new code, please call your [facility] clinic or [phone].        Care EveryWhere ID     This is your Care EveryWhere ID. This could be used by other organizations to access your [facility] medical records  [id number]        Your Vitals Were     Pulse Height BMI (Body Mass Index)             84 1.727 m (5' 7.99\") 26.46 kg/m2          Blood Pressure from Last 3 Encounters:   17 108/80   17 98/62   17 119/78    Weight from Last 3 Encounters:   17 78.9 kg (174 lb)   17 79.4 kg (175 lb)   17 79.6 kg (175 lb 8 oz)               Primary Care Provider Office Phone # Fax #    Indra Mark Wilkinson PA-C [phone]       [address] [address]        Thank you!     Thank you for choosing [facility] AT [place]  for your care. Our goal is always to provide you with excellent care. Hearing back from our patients is one way we can continue to improve our services. Please take a few minutes to complete the written survey that you may receive in the mail after your visit with us. Thank you!             Your Updated Medication List - Protect others around you: Learn how to safely use, store and throw away your medicines at www.disposemymeds.org.          This list is accurate as of: 3/1/17  9:28 AM.  Always use your most recent med list.                   Brand Name Dispense Instructions for use    acetaminophen 325 MG tablet    TYLENOL    " 100 tablet    Take 2 tablets (650 mg) by mouth every 4 hours as needed for mild pain or fever (greater than or equal to 38? C /100.4? F (oral) or 38.5? C/ 101.4? F (core).)       omeprazole 20 MG CR capsule    priLOSEC    30 capsule    Take 1 capsule (20 mg) by mouth daily       PRENATAL VITAMIN PO      Take 1 tablet by mouth

## 2017-03-01 NOTE — PROGRESS NOTES
HPI and Plan:   See dictation  [id number]    Orders Placed This Encounter   Procedures     Follow-Up with Cardiac Advanced Practice Provider     Holter Monitor 24 hour - Adult     Echocardiogram       No orders of the defined types were placed in this encounter.      Medications Discontinued During This Encounter   Medication Reason     ibuprofen (ADVIL,MOTRIN) 200 MG tablet Stopped by Patient     ranitidine (ZANTAC) 300 MG tablet Stopped by Patient     norelgestromin-ethinyl estradiol (ORTHO EVRA) 150-35 MCG/24HR patch Stopped by Patient         Encounter Diagnoses   Name Primary?     Palpitations Yes     Gastroesophageal reflux disease without esophagitis      Gastroesophageal reflux disease, esophagitis presence not specified        CURRENT MEDICATIONS:  Current Outpatient Prescriptions   Medication Sig Dispense Refill     omeprazole (PRILOSEC) 20 MG CR capsule Take 1 capsule (20 mg) by mouth daily 30 capsule 1     acetaminophen (TYLENOL) 325 MG tablet Take 2 tablets (650 mg) by mouth every 4 hours as needed for mild pain or fever (greater than or equal to 38  C /100.4  F (oral) or 38.5  C/ 101.4  F (core).) 100 tablet      Prenatal Vit-Fe Fumarate-FA (PRENATAL VITAMIN PO) Take 1 tablet by mouth         ALLERGIES     Allergies   Allergen Reactions     Blood Transfusion Related (Informational Only) Other (See Comments)     Patient has a history of a clinically significant antibody against RBC antigens.  A delay in compatible RBCs may occur.       PAST MEDICAL HISTORY:  Past Medical History   Diagnosis Date     No pertinent past medical history        PAST SURGICAL HISTORY:  Past Surgical History   Procedure Laterality Date     Revise circumcision female         FAMILY HISTORY:  Family History   Problem Relation Age of Onset     Unknown/Adopted Mother      Unknown/Adopted Father        SOCIAL HISTORY:  Social History     Social History     Marital status:      Spouse name: N/A     Number of children: N/A      "Years of education: N/A     Social History Main Topics     Smoking status: Never Smoker     Smokeless tobacco: Never Used     Alcohol use No     Drug use: No     Sexual activity: Yes     Partners: Male     Birth control/ protection: Pill     Other Topics Concern     None     Social History Narrative       Review of Systems:  Skin:  Negative       Eyes:  Negative      ENT:  Negative      Respiratory:  Negative       Cardiovascular:    palpitations;Positive for;fatigue;dizziness    Gastroenterology: Positive for abdominal pain burning on the upper part of the abdomen under left breast   Genitourinary:  Negative      Musculoskeletal:  Negative      Neurologic:  Negative      Psychiatric:    sleep disturbances    Heme/Lymph/Imm:  Negative      Endocrine:  Negative        Physical Exam:  Vitals: /80  Pulse 84  Ht 1.727 m (5' 7.99\")  Wt 78.9 kg (174 lb)  BMI 26.46 kg/m2    Constitutional:  cooperative;alert and oriented overweight      Skin:  warm and dry to the touch        Head:  no masses or lesions        Eyes:  pupils equal and round        ENT:  dentition good        Neck:  carotid pulses are full and equal bilaterally        Chest:  clear to auscultation          Cardiac: regular rhythm;normal S1 and S2     no presence of murmur            Abdomen:  abdomen soft        Vascular: not assessed this visit                                        Extremities and Back:  no edema              Neurological:  no gross motor deficits              BRENDAN Wilkinson PA-C  [facility]  [address] [place] [address]              "

## 2017-03-01 NOTE — PROGRESS NOTES
HISTORY OF PRESENT ILLNESS:  I had the pleasure of seeing  Denzel Tripp in Cardiology Clinic for palpitations.  Denzel Tripp is a 31-year-old [place] woman.  She is 3 months postpartum.  She has 4 kids.  Over the last 3-4 days she has had palpitations in the form of heart racing or feeling her heart beating strong, especially when she is laying down at night to sleep.  This is bothering her enough that she is not able to sleep.  In addition, she is not sleeping because she has to take care of her baby.  The palpitations are fairly regular.  This was concerning enough to her that she saw you recently and was referred to me.  She had some palpitations last March and April and had a ZIO Patch monitor for 24 hours which showed sinus rhythm with occasional PACs and PVCs.  She drinks 1 cup of tea 2-3 times a week.      There is no shortness of breath, no orthopnea or PND, no edema of feet.  She has occasional burning in the epigastric and left inframammary region off and on over the last 2 days associated with burping and belching.  It is nonexertional.  This was evaluated by you and you started her on Prilosec which she has been taking for 2 days.      Because of the palpitations, she went to the emergency room yesterday and she had a TSH which was normal.  No abnormality was found and she was discharged on potassium; however, it was slightly low at 3.2 and apparently was replaced.      PHYSICAL EXAMINATION:  Blood pressure 108/80, pulse 84 per minute and regular.  Cardiopulmonary exam unremarkable.      IMPRESSION:   1.  Palpitations.  At this time, I am not sure if she is having any arrhythmia or if she is more sensitive of her heartbeats.  She seems anxious and has not slept in several days because of her new baby.  Yesterday she was given IV Ativan and felt much better in the emergency room according to the patient and her .  There may be an element of anxiety here but we should rule out any arrhythmia.  Since she  is having constant palpitations for the last few days, I think a Holter as an initial step would be a reasonable option.  I would also recommend echocardiogram to rule out structural heart disease.  I have asked her to cut down caffeine.  I will have her see my nurse practitioner at [facility] office after that.  If there are no significant arrhythmias and echocardiogram seems normal, that would be reassuring, but if required, we may consider doing a low-dose beta blocker for symptomatic purposes.  She tells me that she will stop breastfeeding in a week, and if that is the case, I think it will be more safe for her to use beta blockers.  I also feel that treating anxiety would help if it is present.  I have asked them to call Indra Wilkinson and discuss that further.  She did feel better with the Ativan  yesterday.   2.  Heartburn.  This appears more like gastroesophageal reflux disease.  She started recently on Prilosec.  I would try that for a few days and see if this gets better.  If it persists, we may consider doing a stress treadmill test.  EKG done yesterday was essentially normal and reviewed by me.   3.  Mild hypokalemia.  This was replaced yesterday in the emergency room and I advised them to continue to replenish potassium by taking high potassium-containing foods like bananas.  This may have to be rechecked as an outpatient.  It can be done by my nurse practitioner along with magnesium levels.      Thank you for allowing us to participate in the care of this nice patient.  We will see her back in followup after the echo and Holter is completed.      Casey Haynes MD      cc:    Indra Wilkinson PA-C   [address]  [address]         CASEY HAYNES MD             D: 2017 09:42   T: 2017 15:16   MT: EMIL      Name:     MEL MAY   MRN:      [id number]        Account:      [id number]   :      1985           Service Date: 2017       Document: [id number]

## 2017-03-03 ENCOUNTER — TELEPHONE (OUTPATIENT)
Dept: FAMILY MEDICINE | Facility: CLINIC | Age: 32
End: 2017-03-03

## 2017-03-03 DIAGNOSIS — G47.00 INSOMNIA, UNSPECIFIED TYPE: Primary | ICD-10-CM

## 2017-03-03 RX ORDER — TRAZODONE HYDROCHLORIDE 50 MG/1
50 TABLET, FILM COATED ORAL
Qty: 7 TABLET | Refills: 0 | Status: SHIPPED | OUTPATIENT
Start: 2017-03-03 | End: 2017-03-21

## 2017-03-03 NOTE — TELEPHONE ENCOUNTER
Spouse informed and agrees to plan.  Pt stopped breast feeding 10 days ago.   OV scheduled next week.   Paulino Luke RN

## 2017-03-03 NOTE — TELEPHONE ENCOUNTER
Looks like it was recommended to discuss anxiety medication. I did discuss this with patient briefly at her last visit. But for her sleep, which may be anxiety driven, will send a small amount of a sleep aid to her pharmacy and recommending following up early next week to discuss further management of anxiety. Trazodone sent to pharmacy. To be taken nightly prn for insomnia. Recommend not breast feeding for rest of night after taking this.     Thanks,    Cristhian Wilkinson, PAC

## 2017-03-03 NOTE — TELEPHONE ENCOUNTER
Pt. Gave Verbal consent to speak with .    Pt. Requesting sleep aid or medication for relaxation as recommended by cardiology. Please  review 3/1/2017 cardiology OV note.     Please advise. If needs in to be seen will need . If ok with medication options pharm Td'up. Please call at [phone]. Ok to LM.      Dafne Marie, RN, BSN, PHN

## 2017-03-06 ENCOUNTER — OFFICE VISIT (OUTPATIENT)
Dept: FAMILY MEDICINE | Facility: CLINIC | Age: 32
End: 2017-03-06
Payer: COMMERCIAL

## 2017-03-06 VITALS
SYSTOLIC BLOOD PRESSURE: 110 MMHG | DIASTOLIC BLOOD PRESSURE: 74 MMHG | RESPIRATION RATE: 18 BRPM | TEMPERATURE: 98.1 F | WEIGHT: 173 LBS | HEART RATE: 78 BPM | BODY MASS INDEX: 26.31 KG/M2

## 2017-03-06 DIAGNOSIS — E87.6 HYPOKALEMIA: ICD-10-CM

## 2017-03-06 DIAGNOSIS — R00.2 PALPITATIONS: Primary | ICD-10-CM

## 2017-03-06 DIAGNOSIS — G47.00 INSOMNIA, UNSPECIFIED TYPE: ICD-10-CM

## 2017-03-06 PROCEDURE — 99213 OFFICE O/P EST LOW 20 MIN: CPT | Performed by: PHYSICIAN ASSISTANT

## 2017-03-06 NOTE — MR AVS SNAPSHOT
After Visit Summary   3/6/2017    Denzel Tripp    MRN: [id number]           Patient Information     Date Of Birth          1985        Visit Information        Provider Department      3/6/2017 10:45 AM Indra Wilkinson PA-C; [employer] [facility]        Care Instructions      Panic Attack  A panic attack is an extreme fear reaction that comes on for no clear reason. There is often a fear that something terrible will happen or that you may die. The attack may last a few minutes up to a few hours. Between attacks, things will seem quite normal. This condition has a psychological cause and can be treated with the help of a therapist or psychiatrist. Medicine can be very helpful for this problem.  Panic attacks usually come on suddenly, reaches a peak within minutes, and includes at least 4 of these symptoms:    Palpitations, pounding heart, or accelerated heart rate    Sweating    Crying    Trembling or shaking    Sensations of shortness of breath or smothering    Feelings of choking    Chest pain or discomfort    Nausea or abdominal distress    Feeling dizzy, unsteady, light-headed, or faint    Numbness or tingling sensations    Fear of dying    Fear of going crazy or of losing control    Feelings of unreality, strangeness, or detachment from the environment  Many of these symptoms can be linked to physical problems, so it is sometimes necessary to rule out conditions like thyroid disorders, heart disease, gastrointestinal problems, and others. They can also start as physical symptoms, but psychologically we may react to them in a fearful way, worsening the way we react and feel.  Home care    Try to find the sources of stress in your life. They may not be obvious. These may include:    Daily hassles of life which pile up (traffic jams, missed appointments, car troubles, etc.).    Major life changes, both good (new baby, job promotion) and bad (loss of  job, loss of loved one).    Feeling that you have too many responsibilities and can't take care of everything at once.    Helplessness: feeling like your problems are too much for you to handle.    Notice how your body reacts to stress. Learn to listen to your body signals so that you can take action before the stress becomes severe.    Try to be aware of what you were doing before the reaction started; this may give you clues to things that can trigger a reaction. It may be situations in your life, or what you were doing at the time.    When possible, avoid or reduce the cause of stress. Avoid hassles, limit the amount of change that is happening in your life at one time or take a break when you feel overloaded.    Unfortunately, many stressful situations cannot be avoided. Therefore, it is necessary to learn how to manage stress better. There are many proven methods that work and will reduce your anxiety. These include simple things like exercise, good nutrition and adequate rest. Also, there are certain techniques that are helpful: relaxation and breathing exercises, visualization, biofeedback, meditation or simply taking some time-out to clear your mind. For more information about this, ask your doctor or go to a local bookstore and review the many books and tapes available on this subject.  Follow-up care  Follow-up with your healthcare provider, or as advised.  Call 911  Call 911 if any of these occur:    Trouble breathing    Confusion    Very drowsy or trouble awakening    Fainting or loss of consciousness    Rapid heart rate    Seizure    New chest pain that becomes more severe, lasts longer, or spreads into your shoulder, arm, neck, jaw or back  When to seek medical advice  Call your healthcare provider right away if any of these occur:    Worsening of your symptoms to the point of feeling out-of-control    Increased pain with breathing    Increasing feeling of weakness or dizziness    Cough with dark  colored sputum (phlegm) or blood    Fever 1 degree above normal temperature ) lasting 24 to 48 hours or what your healthcare provider has advised    Swelling, pain or redness in one leg    Requests by family or friends for you to seek help for your symptoms    3513-8712 [employer]. [address] [place] [address]. All rights reserved. This information is not intended as a substitute for professional medical care. Always follow your healthcare professional's instructions.              Follow-ups after your visit        Your next 10 appointments already scheduled     Mar 09, 2017  9:00 AM CST   Ech Complete with [email or web address]   [facility] ([facility])    [address]  [facility] [id number] [phone]           1.  Please bring or wear a comfortable two-piece outfit. 2.  You may eat, drink and take your normal medicines. 3.  For any questions that cannot be answered, please contact the ordering physician ***Please check-in at the [facility] located in [address] in the [facility] building. When you are finished registering, please go to [address] and have a seat. The technician will call your name for the test.            Mar 09, 2017 10:00 AM CST   Holter Monitor with RSCC DEVICE [facility] ([facility])    [address]  [facility] [id number] [phone]           LOCATION - [address], [address] [place] [id number] **Please check-in at the [facility], [address], in the [facility] building. When you are finished registering, please go to [address] and have a seat.            Mar 21, 2017  1:50 PM CDT   Return Visit with Pat Arteaga PA-C   [facility] PHYSIC[facility] AT [place] ([facility])    [address]  [facility] [address] [phone]  "             Who to contact     If you have questions or need follow up information about today's clinic visit or your schedule please contact [facility] directly at [phone].  Normal or non-critical lab and imaging results will be communicated to you by MyChart, letter or phone within 4 business days after the clinic has received the results. If you do not hear from us within 7 days, please contact the clinic through MyChart or phone. If you have a critical or abnormal lab result, we will notify you by phone as soon as possible.  Submit refill requests through [employer] or call your pharmacy and they will forward the refill request to us. Please allow 3 business days for your refill to be completed.          Additional Information About Your Visit        [employer] Information     [employer] lets you send messages to your doctor, view your test results, renew your prescriptions, schedule appointments and more. To sign up, go to [email or web address] . Click on \"Log in\" on the left side of the screen, which will take you to the Welcome page. Then click on \"Sign up Now\" on the right side of the page.     You will be asked to enter the access code listed below, as well as some personal information. Please follow the directions to create your username and password.     Your access code is: [email or web address]  Expires: 3/13/2017 11:35 AM     Your access code will  in 90 days. If you need help or a new code, please call your [facility] clinic or [phone].        Care EveryWhere ID     This is your Care EveryWhere ID. This could be used by other organizations to access your [facility] medical records  [id number]        Your Vitals Were     Pulse Temperature Respirations Breastfeeding? BMI (Body Mass Index)       78 98.1  F (36.7  C) (Oral) 18 No 26.31 kg/m2        Blood Pressure from Last 3 Encounters:   17 110/74   17 108/80   17 98/62    Weight from Last 3 Encounters:   17 173 " lb (78.5 kg)   03/01/17 174 lb (78.9 kg)   02/24/17 175 lb (79.4 kg)              Today, you had the following     No orders found for display         Today's Medication Changes          These changes are accurate as of: 3/6/17 11:31 AM.  If you have any questions, ask your nurse or doctor.               Stop taking these medicines if you haven't already. Please contact your care team if you have questions.     acetaminophen 325 MG tablet   Commonly known as:  TYLENOL   Stopped by:  Indra Wilkinson PA-C                    Primary Care Provider Office Phone # Fax #    Indra Wilkinson PA-C [phone]       [address] [address]        Thank you!     Thank you for choosing [facility]  for your care. Our goal is always to provide you with excellent care. Hearing back from our patients is one way we can continue to improve our services. Please take a few minutes to complete the written survey that you may receive in the mail after your visit with us. Thank you!             Your Updated Medication List - Protect others around you: Learn how to safely use, store and throw away your medicines at www.disposemymeds.org.          This list is accurate as of: 3/6/17 11:31 AM.  Always use your most recent med list.                   Brand Name Dispense Instructions for use    omeprazole 20 MG CR capsule    priLOSEC    30 capsule    Take 1 capsule (20 mg) by mouth daily       PRENATAL VITAMIN PO      Take 1 tablet by mouth       traZODone 50 MG tablet    DESYREL    7 tablet    Take 1 tablet (50 mg) by mouth nightly as needed for sleep

## 2017-03-06 NOTE — PATIENT INSTRUCTIONS
Panic Attack  A panic attack is an extreme fear reaction that comes on for no clear reason. There is often a fear that something terrible will happen or that you may die. The attack may last a few minutes up to a few hours. Between attacks, things will seem quite normal. This condition has a psychological cause and can be treated with the help of a therapist or psychiatrist. Medicine can be very helpful for this problem.  Panic attacks usually come on suddenly, reaches a peak within minutes, and includes at least 4 of these symptoms:    Palpitations, pounding heart, or accelerated heart rate    Sweating    Crying    Trembling or shaking    Sensations of shortness of breath or smothering    Feelings of choking    Chest pain or discomfort    Nausea or abdominal distress    Feeling dizzy, unsteady, light-headed, or faint    Numbness or tingling sensations    Fear of dying    Fear of going crazy or of losing control    Feelings of unreality, strangeness, or detachment from the environment  Many of these symptoms can be linked to physical problems, so it is sometimes necessary to rule out conditions like thyroid disorders, heart disease, gastrointestinal problems, and others. They can also start as physical symptoms, but psychologically we may react to them in a fearful way, worsening the way we react and feel.  Home care    Try to find the sources of stress in your life. They may not be obvious. These may include:    Daily hassles of life which pile up (traffic jams, missed appointments, car troubles, etc.).    Major life changes, both good (new baby, job promotion) and bad (loss of job, loss of loved one).    Feeling that you have too many responsibilities and can't take care of everything at once.    Helplessness: feeling like your problems are too much for you to handle.    Notice how your body reacts to stress. Learn to listen to your body signals so that you can take action before the stress becomes  severe.    Try to be aware of what you were doing before the reaction started; this may give you clues to things that can trigger a reaction. It may be situations in your life, or what you were doing at the time.    When possible, avoid or reduce the cause of stress. Avoid hassles, limit the amount of change that is happening in your life at one time or take a break when you feel overloaded.    Unfortunately, many stressful situations cannot be avoided. Therefore, it is necessary to learn how to manage stress better. There are many proven methods that work and will reduce your anxiety. These include simple things like exercise, good nutrition and adequate rest. Also, there are certain techniques that are helpful: relaxation and breathing exercises, visualization, biofeedback, meditation or simply taking some time-out to clear your mind. For more information about this, ask your doctor or go to a local bookstore and review the many books and tapes available on this subject.  Follow-up care  Follow-up with your healthcare provider, or as advised.  Call 911  Call 911 if any of these occur:    Trouble breathing    Confusion    Very drowsy or trouble awakening    Fainting or loss of consciousness    Rapid heart rate    Seizure    New chest pain that becomes more severe, lasts longer, or spreads into your shoulder, arm, neck, jaw or back  When to seek medical advice  Call your healthcare provider right away if any of these occur:    Worsening of your symptoms to the point of feeling out-of-control    Increased pain with breathing    Increasing feeling of weakness or dizziness    Cough with dark colored sputum (phlegm) or blood    Fever 1 degree above normal temperature ) lasting 24 to 48 hours or what your healthcare provider has advised    Swelling, pain or redness in one leg    Requests by family or friends for you to seek help for your symptoms    6057-0694 [employer] [address] [facility], [place]  [address]. All rights reserved. This information is not intended as a substitute for professional medical care. Always follow your healthcare professional's instructions.        Discharge Instructions for Gastroesophageal Reflux Disease (GERD)  Gastroesophageal reflux disease (GERD) is a backflow of acid from the stomach into the swallowing tube (esophagus).  Home care  These home care steps can help you manage GERD:    Maintain a healthy weight. Get help to lose any extra pounds.    Avoid lying down after meals.    Avoid eating late at night.    Elevate the head of your bed by 6 inches. You can do this by placing wooden blocks under the head of your bed.    Avoid wearing tight-fitting clothes.    Avoid foods that might irritate your stomach, such as the following:    Alcohol    Fat    Chocolate    Caffeine    Spearmint or peppermint    Talk to your doctor if you are taking any of the following medications. These medications can make GERD symptoms worse:    Calcium channel blockers    Theophylline    Anticholinergic medications such as oxybutynin and benzatropine    Begin an exercise program. Ask your doctor how to get started. You can benefit from simple activities, such as walking or gardening.    Break the smoking habit. Enroll in a stop-smoking program to improve your chances of success.    Limit alcohol intake to no more than 2 drinks a day.    Take your medications exactly as directed. Don t skip doses.    Avoid over-the-counter nonsteroidal anti-inflammatory drugs, such as aspirin and ibuprofen (Advil, Motrin).    If possible, avoid nitrates (heart medications such as nitroglycerin and Isordil).  Follow-up care  Make a follow-up appointment as directed by our staff.     When to seek medical care  Call your doctor immediately if you have any of the following:    Trouble swallowing    Pain when swallowing    Feeling of food caught in your chest or throat    Pain in the neck, chest, or back    Heartburn that causes  you to vomit    Vomiting blood    Black or tarry stools (from digested blood)    More saliva (watering of the mouth) than usual    Weight loss of more than 3% to 5% of your total body weight in a month    Hoarseness or sore throat that won t go away    Choking, coughing, or wheezing     2931-6512 [employer]. [address] [place] [address], [place] [address]. All rights reserved. This information is not intended as a substitute for professional medical care. Always follow your healthcare professional's instructions.

## 2017-03-06 NOTE — PROGRESS NOTES
SUBJECTIVE:                                                    Denzel Tripp is a 31 year old female who presents to clinic today for the following health issues:      -heart palpitations; patient has apt 3/9/17 with cardiologist. Was seen by cardiology on 3/1/17 where it was recommended by cardiologist to follow up with myself to discuss potential anxiety. Of note, patient was not sleeping well. On 3/3/17, a small amount of prn trazodone was given. She took one medication and slept well. Has not taken since and continues to sleep well. No palpitations since her ER visit. Patient continues to denies symptoms of anxiety or depression. No known family history of this as well. Other than recurrent palpitations, no known stressors. Thyroid was rechecked at 2/24/17 appt and was normal.     Continues to take omeprazole without side effects.       Problem list and histories reviewed & adjusted, as indicated.  Additional history: as documented    Patient Active Problem List   Diagnosis     CARDIOVASCULAR SCREENING; LDL GOAL LESS THAN 160     Vaginal delivery     Routine postpartum follow-up     GBS (group B streptococcus) UTI complicating pregnancy     Amniotic fluid leaking     Vitamin D deficiency     Indication for care in labor or delivery     Ganglion cyst     Palpitations     Gastroesophageal reflux disease, esophagitis presence not specified     Past Surgical History   Procedure Laterality Date     Revise circumcision female         Social History   Substance Use Topics     Smoking status: Never Smoker     Smokeless tobacco: Never Used     Alcohol use No     Family History   Problem Relation Age of Onset     Unknown/Adopted Mother      Unknown/Adopted Father          Current Outpatient Prescriptions   Medication Sig Dispense Refill     traZODone (DESYREL) 50 MG tablet Take 1 tablet (50 mg) by mouth nightly as needed for sleep 7 tablet 0     omeprazole (PRILOSEC) 20 MG CR capsule Take 1 capsule (20 mg) by mouth daily  30 capsule 1     Prenatal Vit-Fe Fumarate-FA (PRENATAL VITAMIN PO) Take 1 tablet by mouth       Allergies   Allergen Reactions     Blood Transfusion Related (Informational Only) Other (See Comments)     Patient has a history of a clinically significant antibody against RBC antigens.  A delay in compatible RBCs may occur.     BP Readings from Last 3 Encounters:   03/06/17 110/74   03/01/17 108/80   03/01/17 98/62    Wt Readings from Last 3 Encounters:   03/06/17 173 lb (78.5 kg)   03/01/17 174 lb (78.9 kg)   02/24/17 175 lb (79.4 kg)                  Labs reviewed in EPIC    Reviewed and updated as needed this visit by clinical staff  Tobacco  Allergies  Meds  Problems  Med Hx  Surg Hx  Fam Hx  Soc Hx        Reviewed and updated as needed this visit by Provider  Allergies  Meds  Problems         ROS:  Constitutional, HEENT, cardiovascular, pulmonary, GI, , musculoskeletal, neuro, skin, endocrine and psych systems are negative, except as otherwise noted.    OBJECTIVE:                                                    /74 (BP Location: Right arm, Patient Position: Chair, Cuff Size: Adult Regular)  Pulse 78  Temp 98.1  F (36.7  C) (Oral)  Resp 18  Wt 173 lb (78.5 kg)  Breastfeeding? No  BMI 26.31 kg/m2  Body mass index is 26.31 kg/(m^2).  GENERAL: healthy, alert and no distress  PSYCH: mentation appears normal, affect normal/bright    Diagnostic Test Results:  none      ASSESSMENT/PLAN:                                                      (R00.2) Palpitations  (primary encounter diagnosis)  Comment: again, discussed with patient and  how anxiety can present atypically and this may be underlying cause. I see where there are plans to follow with cardiology at the end of this week. Both  and patient would like to continue with following cardiology for now. Did discuss SSRI management and patient is not interested at this time. Discussed if work up with cardiology is negative and symptoms  continue, to rtc to further discuss.   Plan:     (G47.00) Insomnia, unspecified type  Comment: resolved. Has taken only 1 trazodone. Recommending continued prn use. May have resulted in worsening anxiety while at cardiologist.   Plan:     (E87.6) Hypokalemia  Comment: was noted while at ER. Mild. Appears plans to recheck this through cardiology.   Plan:     Follow up: prn and per cardiology.     Indra Wilkinson PA-C  [facility]

## 2017-03-06 NOTE — NURSING NOTE
"Chief Complaint   Patient presents with     Sleep Problem     Initial /74 (BP Location: Right arm, Patient Position: Chair, Cuff Size: Adult Regular)  Pulse 78  Temp 98.1  F (36.7  C) (Oral)  Resp 18  Wt 173 lb (78.5 kg)  Breastfeeding? No  BMI 26.31 kg/m2 Estimated body mass index is 26.31 kg/(m^2) as calculated from the following:    Height as of 3/1/17: 5' 7.99\" (1.727 m).    Weight as of this encounter: 173 lb (78.5 kg)..  BP completed using cuff size large-RA    "

## 2017-03-09 ENCOUNTER — HOSPITAL ENCOUNTER (OUTPATIENT)
Dept: CARDIOLOGY | Facility: CLINIC | Age: 32
Discharge: HOME | End: 2017-03-09
Attending: INTERNAL MEDICINE | Admitting: INTERNAL MEDICINE
Payer: COMMERCIAL

## 2017-03-09 DIAGNOSIS — R00.2 PALPITATIONS: ICD-10-CM

## 2017-03-09 PROCEDURE — 93306 TTE W/DOPPLER COMPLETE: CPT

## 2017-03-09 PROCEDURE — 93227 XTRNL ECG REC<48 HR R&I: CPT | Performed by: INTERNAL MEDICINE

## 2017-03-09 PROCEDURE — 93306 TTE W/DOPPLER COMPLETE: CPT | Mod: 26 | Performed by: INTERNAL MEDICINE

## 2017-03-10 ENCOUNTER — TELEPHONE (OUTPATIENT)
Dept: CARDIOLOGY | Facility: CLINIC | Age: 32
End: 2017-03-10

## 2017-03-10 NOTE — TELEPHONE ENCOUNTER
Informed Pt's  need to have Pt sign consent for  to receive Pt's information.  will give  consent form to take home. Informed  only that Pt is fine regarding recent test results await signed consent for any detailed information. SERAFIN brown RN

## 2017-03-14 ENCOUNTER — TELEPHONE (OUTPATIENT)
Dept: CARDIOLOGY | Facility: CLINIC | Age: 32
End: 2017-03-14

## 2017-03-14 NOTE — TELEPHONE ENCOUNTER
Pt's  informed Pt's echo normal and Holter report shows 371 PAC's which are benign and cannot hurt Pt.  Pt will keep appointment so results can be further explained.  SERAFIN Lynn RN

## 2017-03-17 DIAGNOSIS — R00.2 PALPITATIONS: Primary | ICD-10-CM

## 2017-03-21 ENCOUNTER — OFFICE VISIT (OUTPATIENT)
Dept: CARDIOLOGY | Facility: CLINIC | Age: 32
End: 2017-03-21
Attending: INTERNAL MEDICINE
Payer: COMMERCIAL

## 2017-03-21 VITALS
WEIGHT: 177 LBS | SYSTOLIC BLOOD PRESSURE: 116 MMHG | HEART RATE: 73 BPM | DIASTOLIC BLOOD PRESSURE: 82 MMHG | OXYGEN SATURATION: 99 % | BODY MASS INDEX: 26.83 KG/M2 | HEIGHT: 68 IN

## 2017-03-21 DIAGNOSIS — R00.2 PALPITATIONS: ICD-10-CM

## 2017-03-21 LAB
ANION GAP SERPL CALCULATED.3IONS-SCNC: 6 MMOL/L (ref 3–14)
BUN SERPL-MCNC: 8 MG/DL (ref 7–30)
CALCIUM SERPL-MCNC: 8.6 MG/DL (ref 8.5–10.1)
CHLORIDE SERPL-SCNC: 106 MMOL/L (ref 94–109)
CO2 SERPL-SCNC: 28 MMOL/L (ref 20–32)
CREAT SERPL-MCNC: 0.59 MG/DL (ref 0.52–1.04)
GFR SERPL CREATININE-BSD FRML MDRD: ABNORMAL ML/MIN/1.7M2
GLUCOSE SERPL-MCNC: 66 MG/DL (ref 70–99)
POTASSIUM SERPL-SCNC: 3.5 MMOL/L (ref 3.4–5.3)
SODIUM SERPL-SCNC: 140 MMOL/L (ref 133–144)

## 2017-03-21 PROCEDURE — 36415 COLL VENOUS BLD VENIPUNCTURE: CPT | Performed by: PHYSICIAN ASSISTANT

## 2017-03-21 PROCEDURE — 99213 OFFICE O/P EST LOW 20 MIN: CPT | Performed by: PHYSICIAN ASSISTANT

## 2017-03-21 PROCEDURE — 80048 BASIC METABOLIC PNL TOTAL CA: CPT | Performed by: PHYSICIAN ASSISTANT

## 2017-03-21 RX ORDER — ACETAMINOPHEN 325 MG/1
325-650 TABLET ORAL PRN
COMMUNITY

## 2017-03-21 NOTE — PROGRESS NOTES
HPI and Plan:   See dictation  Conf# [id number]    Orders this Visit:  No orders of the defined types were placed in this encounter.    Orders Placed This Encounter   Medications     acetaminophen (TYLENOL) 325 MG tablet     Sig: Take 325-650 mg by mouth as needed for mild pain     Medications Discontinued During This Encounter   Medication Reason     Prenatal Vit-Fe Fumarate-FA (PRENATAL VITAMIN PO) Stopped by Patient     traZODone (DESYREL) 50 MG tablet Stopped by Patient         Encounter Diagnosis   Name Primary?     Palpitations        CURRENT MEDICATIONS:  Current Outpatient Prescriptions   Medication Sig Dispense Refill     acetaminophen (TYLENOL) 325 MG tablet Take 325-650 mg by mouth as needed for mild pain       omeprazole (PRILOSEC) 20 MG CR capsule Take 1 capsule (20 mg) by mouth daily 30 capsule 1       ALLERGIES  Allergies   Allergen Reactions     Blood Transfusion Related (Informational Only) Other (See Comments)     Patient has a history of a clinically significant antibody against RBC antigens.  A delay in compatible RBCs may occur.       PAST MEDICAL HISTORY:  Past Medical History   Diagnosis Date     No pertinent past medical history        PAST SURGICAL HISTORY:  Past Surgical History   Procedure Laterality Date     Revise circumcision female         FAMILY HISTORY:  Family History   Problem Relation Age of Onset     Unknown/Adopted Mother      Unknown/Adopted Father        SOCIAL HISTORY:  Social History     Social History     Marital status:      Spouse name: N/A     Number of children: N/A     Years of education: N/A     Social History Main Topics     Smoking status: Never Smoker     Smokeless tobacco: Never Used     Alcohol use No     Drug use: No     Sexual activity: Yes     Partners: Male     Birth control/ protection: Pill     Other Topics Concern     None     Social History Narrative       Review of Systems:  Skin:  Negative     Eyes:  Negative    ENT:  Negative    Respiratory:   "Negative    Cardiovascular:  Negative    Gastroenterology: Positive for reflux  Genitourinary:  Negative    Musculoskeletal:  Negative    Neurologic:  Negative    Psychiatric:  Negative    Heme/Lymph/Imm:  Negative    Endocrine:  Negative        Physical Exam:  Vitals: /82 (BP Location: Right arm, Patient Position: Chair, Cuff Size: Adult Regular)  Pulse 73  Ht 1.715 m (5' 7.5\")  Wt 80.3 kg (177 lb)  SpO2 99%  BMI 27.31 kg/m2    Constitutional:  cooperative;alert and oriented        Skin:  warm and dry to the touch        Head:  no masses or lesions        Eyes:  pupils equal and round        ENT:  dentition good        Neck:  not assessed this visit        Chest:  clear to auscultation        Cardiac: regular rhythm;normal S1 and S2     no presence of murmur            Abdomen:  abdomen soft;BS normoactive;non-tender        Vascular: not assessed this visit                                      Extremities and Back:  no edema        Neurological:  no gross motor deficits;affect appropriate, oriented to time, person and place          Recent Lab Results:  LIPID RESULTS:  No results found for: CHOL, HDL, LDL, TRIG, CHOLHDLRATIO    LIVER ENZYME RESULTS:  Lab Results   Component Value Date    AST 20 03/01/2017    ALT 25 03/01/2017       CBC RESULTS:  Lab Results   Component Value Date    WBC 6.9 03/01/2017    RBC 4.34 03/01/2017    HGB 13.2 03/01/2017    HCT 39.2 03/01/2017    MCV 90 03/01/2017    MCH 30.4 03/01/2017    MCHC 33.7 03/01/2017    RDW 12.6 03/01/2017     03/01/2017       BMP RESULTS:  Lab Results   Component Value Date     03/21/2017    POTASSIUM 3.5 03/21/2017    CHLORIDE 106 03/21/2017    CO2 28 03/21/2017    ANIONGAP 6 03/21/2017    GLC 66 (L) 03/21/2017    BUN 8 03/21/2017    CR 0.59 03/21/2017    GFRESTIMATED >90  Non  GFR Calc   03/21/2017    GFRESTBLACK >90   GFR Calc   03/21/2017    KEYLA 8.6 03/21/2017        A1C RESULTS:  No results found for: " A1C    INR RESULTS:  No results found for: INR        CC  Casey Haynes MD   [facility]  [address]  [place] [address]

## 2017-03-21 NOTE — MR AVS SNAPSHOT
After Visit Summary   3/21/2017    Denzel Tripp    MRN: [id number]           Patient Information     Date Of Birth          1985        Visit Information        Provider Department      3/21/2017 1:35 PM Pat Arteaga PA-C; [employer] [facility] [place]        Today's Diagnoses     Palpitations          Care Instructions    The echocardiogram shows your heart is structurally normal, and the heart monitor shows normal rhythm with some extra beats that are not concerning.     Your potassium today is better, but still on the lower end of normal. So continue to eat foods higher in potassium.     The palpitations may have been due to low potassium, or poor sleep. Your sleep and potassium are better, your symptoms are improved, and the results look good, so we do not need to do any further testing.     Please let us know if you have recurrent symptoms.             Follow-ups after your visit        Who to contact     If you have questions or need follow up information about today's clinic visit or your schedule please contact [facility] [place] directly at [phone].  Normal or non-critical lab and imaging results will be communicated to you by Lightningcasthart, letter or phone within 4 business days after the clinic has received the results. If you do not hear from us within 7 days, please contact the clinic through PlanGridt or phone. If you have a critical or abnormal lab result, we will notify you by phone as soon as possible.  Submit refill requests through De[employer] or call your pharmacy and they will forward the refill request to us. Please allow 3 business days for your refill to be completed.          Additional Information About Your Visit        Deenty Information     Deenty lets you send messages to your doctor, view your test results, renew your prescriptions, schedule appointments and more. To sign up, go to  [email or web address] . Click on \"Log in\" on the left side of the screen, which will take you to the Welcome page. Then click on \"Sign up Now\" on the right side of the page.     You will be asked to enter the access code listed below, as well as some personal information. Please follow the directions to create your username and password.     Your access code is: [email or web address]  Expires: 2017  2:07 PM     Your access code will  in 90 days. If you need help or a new code, please call your [facility] clinic or [phone].        Care EveryWhere ID     This is your Care EveryWhere ID. This could be used by other organizations to access your [facility] medical records  [id number]        Your Vitals Were     Pulse Height Pulse Oximetry BMI (Body Mass Index)          73 1.715 m (5' 7.5\") 99% 27.31 kg/m2         Blood Pressure from Last 3 Encounters:   17 116/82   17 110/74   17 108/80    Weight from Last 3 Encounters:   17 80.3 kg (177 lb)   17 78.5 kg (173 lb)   17 78.9 kg (174 lb)              We Performed the Following     Follow-Up with Cardiac Advanced Practice Provider        Primary Care Provider Office Phone # Fax #    Indra Mark Wilkinson PA-C [phone]       [address] [address]        Thank you!     Thank you for choosing [facility] [place]  for your care. Our goal is always to provide you with excellent care. Hearing back from our patients is one way we can continue to improve our services. Please take a few minutes to complete the written survey that you may receive in the mail after your visit with us. Thank you!             Your Updated Medication List - Protect others around you: Learn how to safely use, store and throw away your medicines at www.disposemymeds.org.          This list is accurate as of: 3/21/17  2:22 PM.  Always use your most recent med list.       "             Brand Name Dispense Instructions for use    omeprazole 20 MG CR capsule    priLOSEC    30 capsule    Take 1 capsule (20 mg) by mouth daily       TYLENOL 325 MG tablet   Generic drug:  acetaminophen      Take 325-650 mg by mouth as needed for mild pain

## 2017-03-21 NOTE — PATIENT INSTRUCTIONS
The echocardiogram shows your heart is structurally normal, and the heart monitor shows normal rhythm with some extra beats that are not concerning.     Your potassium today is better, but still on the lower end of normal. So continue to eat foods higher in potassium.     The palpitations may have been due to low potassium, or poor sleep. Your sleep and potassium are better, your symptoms are improved, and the results look good, so we do not need to do any further testing.     Please let us know if you have recurrent symptoms.

## 2017-03-21 NOTE — LETTER
3/21/2017    Indra Wilkinson PA-C  [facility]   [address] [facility] [address] [address]    RE: Denzel Tripp       Dear Colleague,    I had the pleasure of following up with Denzel Tripp in the Cardiology Clinic for palpitations.  Denzel is a very pleasant 31-year-old [place] woman.  She was accompanied by her , Raheel, and a [place] .  Denzel was seen in Cardiology consultation by Dr. Haynes on 03/01/2017 due to palpitations.      Denzel is now approximately 4 months postpartum.  When she saw Dr. Haynes, she had had a few days of palpitations in the form of her heart racing or her heart beating strong especially when she is lying down to sleep at night.  She was seen in primary care clinic and was referred to Cardiology.  It was noted that she did have some palpitations in March and April of 2016 and a Zio Patch was worn for 24 hours, which showed sinus rhythm with occasional PACs and PVCs.  She drinks 1 cup of tea a few times a week.  She was started on omeprazole recently for some epigastric burning associated with burping and belching.  Recent TSH was normal.  A recent potassium was mildly decreased at 3.2.  I note that she was seen in the Emergency Department, and they did replace the potassium and she was given IV Ativan and felt much better.  Dr. Haynes recommended an echocardiogram to rule out structural heart disease and a Holter monitor to evaluate her rhythm.  He also recommended that she cut down caffeine.      The echocardiogram was done and showed LVEF of 60%-65%, no regional wall motion abnormalities, no significant valve disease.  The Holter monitor was performed for 24 hours and showed sinus rhythm with an average heart rate of 84, maximum heart rate 168 beats per minute and minimum heart rate of 51 beats per minute.  There were very infrequent PVCs, numbering 10 total.  There were 371 isolated PACs.  Denzel pressed the event button twice, and the rhythm during these times  was sinus with no ectopy.  Today in the clinic, Denzel tells me that her palpitations have resolved.  She is sleeping better, only getting up once at night with the baby.  She has stopped breastfeeding.  The heartburn is improving.  She has been eating high-potassium foods.  Potassium today is improved at 3.5.      PHYSICAL EXAMINATION:   VITAL SIGNS:  Blood pressure 116/82, pulse 73, weight 177 pounds, BMI 27.37.   GENERAL:  A 31-year-old [place] citizen woman in no apparent distress.   LUNGS:  Clear to auscultation bilaterally.   HEART:  Regular S1, S2, no significant murmur appreciated.   ABDOMEN:  Soft.  Bowel sounds positive, nontender.   EXTREMITIES:  Warm without any pitting edema.   NEUROLOGIC:  Alert and oriented x3.  No focal deficits.      DIAGNOSTICS:  Please see the HPI and EMR for results of the 24-hour Holter monitor, echocardiogram, recent potassium levels.     Outpatient Encounter Prescriptions as of 3/21/2017   Medication Sig Dispense Refill     acetaminophen (TYLENOL) 325 MG tablet Take 325-650 mg by mouth as needed for mild pain       omeprazole (PRILOSEC) 20 MG CR capsule Take 1 capsule (20 mg) by mouth daily 30 capsule 1     [DISCONTINUED] traZODone (DESYREL) 50 MG tablet Take 1 tablet (50 mg) by mouth nightly as needed for sleep 7 tablet 0     [DISCONTINUED] Prenatal Vit-Fe Fumarate-FA (PRENATAL VITAMIN PO) Take 1 tablet by mouth       No facility-administered encounter medications on file as of 3/21/2017.       ASSESSMENT:   1.  Palpitations.  The Holter monitor showed no concerning arrhythmias, and the echocardiogram showed no structural heart disease.  Her potassium has been replaced once, and she continues to eat high-potassium foods.  I am uncertain if the palpitations were caused by hypokalemia or due to her poor sleep.  However, her palpitations are resolved at this time.  For now, no further evaluation is necessary.   2.  Should she have recurrence of symptoms, she is to let us know.   3.  She  did take 1 trazodone pill for sleep, which did help, but since then she has stopped taking this and she is sleeping better.  This is in the setting of stopping breastfeeding and sleeping better in the night.   4.  Heartburn has improved with the addition of Prilosec.   5.  Mild hypokalemia.  Improved after being replaced in the emergency room, and she has been eating potassium-rich foods frequently.      Thank you very much for allowing me to participate in the care of Denzel Tripp.  She will follow up with us in the Cardiology Clinic as needed.     Sincerely,    Pat Arteaga PA-C     [facility]

## 2017-03-22 NOTE — PROGRESS NOTES
HISTORY OF PRESENT ILLNESS:  I had the pleasure of following up with Denzel Tripp in the Cardiology Clinic for palpitations.  Denzel is a very pleasant 31-year-old [place] woman.  She was accompanied by her , Raheel, and a [place] .  Denzel was seen in Cardiology consultation by Dr. Haynes on 03/01/2017 due to palpitations.      Denzel is now approximately 4 months postpartum.  When she saw Dr. Haynes, she had had a few days of palpitations in the form of her heart racing or her heart beating strong especially when she is lying down to sleep at night.  She was seen in primary care clinic and was referred to Cardiology.  It was noted that she did have some palpitations in March and April of 2016 and a Zio Patch was worn for 24 hours, which showed sinus rhythm with occasional PACs and PVCs.  She drinks 1 cup of tea a few times a week.  She was started on omeprazole recently for some epigastric burning associated with burping and belching.  Recent TSH was normal.  A recent potassium was mildly decreased at 3.2.  I note that she was seen in the Emergency Department, and they did replace the potassium and she was given IV Ativan and felt much better.  Dr. Haynes recommended an echocardiogram to rule out structural heart disease and a Holter monitor to evaluate her rhythm.  He also recommended that she cut down caffeine.      The echocardiogram was done and showed LVEF of 60%-65%, no regional wall motion abnormalities, no significant valve disease.  The Holter monitor was performed for 24 hours and showed sinus rhythm with an average heart rate of 84, maximum heart rate 168 beats per minute and minimum heart rate of 51 beats per minute.  There were very infrequent PVCs, numbering 10 total.  There were 371 isolated PACs.  Denzel pressed the event button twice, and the rhythm during these times was sinus with no ectopy.  Today in the clinic, Denzel tells me that her palpitations have resolved.  She is sleeping better, only  getting up once at night with the baby.  She has stopped breastfeeding.  The heartburn is improving.  She has been eating high-potassium foods.  Potassium today is improved at 3.5.      PHYSICAL EXAMINATION:   VITAL SIGNS:  Blood pressure 116/82, pulse 73, weight 177 pounds, BMI 27.37.   GENERAL:  A 31-year-old [place] woman in no apparent distress.   LUNGS:  Clear to auscultation bilaterally.   HEART:  Regular S1, S2, no significant murmur appreciated.   ABDOMEN:  Soft.  Bowel sounds positive, nontender.   EXTREMITIES:  Warm without any pitting edema.   NEUROLOGIC:  Alert and oriented x3.  No focal deficits.      DIAGNOSTICS:  Please see the HPI and EMR for results of the 24-hour Holter monitor, echocardiogram, recent potassium levels.      ASSESSMENT:   1.  Palpitations.  The Holter monitor showed no concerning arrhythmias, and the echocardiogram showed no structural heart disease.  Her potassium has been replaced once, and she continues to eat high-potassium foods.  I am uncertain if the palpitations were caused by hypokalemia or due to her poor sleep.  However, her palpitations are resolved at this time.  For now, no further evaluation is necessary.   2.  Should she have recurrence of symptoms, she is to let us know.   3.  She did take 1 trazodone pill for sleep, which did help, but since then she has stopped taking this and she is sleeping better.  This is in the setting of stopping breastfeeding and sleeping better in the night.   4.  Heartburn has improved with the addition of Prilosec.   5.  Mild hypokalemia.  Improved after being replaced in the emergency room, and she has been eating potassium-rich foods frequently.      Thank you very much for allowing me to participate in the care of Denzel May.  She will follow up with us in the Cardiology Clinic as needed.      LARRY Marroquin PA-C             D: 03/21/2017 14:22   T: 03/22/2017 04:14   MT: VISHAL      Name:     DENZEL MAY    MRN:      [id number]        Account:      [id number]   :      1985           Service Date: 2017      Document: [id number]

## 2021-08-30 ENCOUNTER — PRENATAL OFFICE VISIT (OUTPATIENT)
Dept: NURSING | Facility: CLINIC | Age: 36
End: 2021-08-30

## 2021-08-30 DIAGNOSIS — O09.529 SUPERVISION OF HIGH-RISK PREGNANCY OF ELDERLY MULTIGRAVIDA: Primary | ICD-10-CM

## 2021-08-30 PROCEDURE — 99207 PR NO CHARGE NURSE ONLY: CPT

## 2021-08-30 NOTE — PROGRESS NOTES
NPN nurse visit done over the phone with a [facility] . Pt will be given NPN folder and book at her upcoming appt.   Discussed optional screening available to assess chromosomal anomalies. Questions answered. Pt advised to call the clinic if she has any questions or concerns related to her pregnancy. Prenatal labs will be obtained at her upcoming appt. New prenatal visit scheduled on 9/22/21 with Dr James.    7w3d    Lab Results   Component Value Date    PAP NIL 06/08/2016           Patient supplied answers from flow sheet for:  Prenatal OB Questionnaire.  Past Medical History  Have you ever recieved care for your mental health? : No  Have you ever been in a major accident or suffered serious trauma?: No  Within the last year, has anyone hit, slapped, kicked or otherwise hurt you?: No  In the last year, has anyone forced you to have sex when you didn't want to?: No    Past Medical History 2   Have you ever received a blood transfusion?: No  Would you accept a blood transfusion if was medically recommended?: Yes  Does anyone in your home smoke?: No   Is your blood type Rh negative?: No  Have you ever ?: (!) Yes  Have you been hospitalized for a nonsurgical reason excluding normal delivery?: No  Have you ever had an abnormal pap smear?: No    Past Medical History (Continued)  Do you have a history of abnormalities of the uterus?: No  Did your mother take CHAVA or any other hormones when she was pregnant with you?: Unknown  Do you have any other problems we have not asked about which you feel may be important to this pregnancy?: No       Rachell Will RN

## 2021-09-10 ENCOUNTER — ANCILLARY PROCEDURE (OUTPATIENT)
Dept: ULTRASOUND IMAGING | Facility: CLINIC | Age: 36
End: 2021-09-10
Payer: COMMERCIAL

## 2021-09-10 ENCOUNTER — LAB (OUTPATIENT)
Dept: LAB | Facility: CLINIC | Age: 36
End: 2021-09-10
Payer: COMMERCIAL

## 2021-09-10 DIAGNOSIS — O09.529 SUPERVISION OF HIGH-RISK PREGNANCY OF ELDERLY MULTIGRAVIDA: ICD-10-CM

## 2021-09-10 LAB
ABO/RH(D): NORMAL
ALBUMIN UR-MCNC: NEGATIVE MG/DL
ANTIBODY SCREEN: NEGATIVE
APPEARANCE UR: CLEAR
BACTERIA #/AREA URNS HPF: ABNORMAL /HPF
BILIRUB UR QL STRIP: NEGATIVE
COLOR UR AUTO: YELLOW
ERYTHROCYTE [DISTWIDTH] IN BLOOD BY AUTOMATED COUNT: 12.5 % (ref 10–15)
GLUCOSE UR STRIP-MCNC: NEGATIVE MG/DL
HCT VFR BLD AUTO: 36.1 % (ref 35–47)
HGB BLD-MCNC: 12.2 G/DL (ref 11.7–15.7)
HGB UR QL STRIP: NEGATIVE
KETONES UR STRIP-MCNC: NEGATIVE MG/DL
LEUKOCYTE ESTERASE UR QL STRIP: NEGATIVE
MCH RBC QN AUTO: 30.4 PG (ref 26.5–33)
MCHC RBC AUTO-ENTMCNC: 33.8 G/DL (ref 31.5–36.5)
MCV RBC AUTO: 90 FL (ref 78–100)
NITRATE UR QL: NEGATIVE
PH UR STRIP: 6 [PH] (ref 5–7)
PLATELET # BLD AUTO: 244 10E3/UL (ref 150–450)
RBC # BLD AUTO: 4.01 10E6/UL (ref 3.8–5.2)
RBC #/AREA URNS AUTO: ABNORMAL /HPF
SP GR UR STRIP: <=1.005 (ref 1–1.03)
SPECIMEN EXPIRATION DATE: NORMAL
SQUAMOUS #/AREA URNS AUTO: ABNORMAL /LPF
UROBILINOGEN UR STRIP-ACNC: 0.2 E.U./DL
WBC # BLD AUTO: 6.1 10E3/UL (ref 4–11)
WBC #/AREA URNS AUTO: ABNORMAL /HPF

## 2021-09-10 PROCEDURE — 86780 TREPONEMA PALLIDUM: CPT

## 2021-09-10 PROCEDURE — 86762 RUBELLA ANTIBODY: CPT

## 2021-09-10 PROCEDURE — 81001 URINALYSIS AUTO W/SCOPE: CPT

## 2021-09-10 PROCEDURE — 86850 RBC ANTIBODY SCREEN: CPT

## 2021-09-10 PROCEDURE — 86901 BLOOD TYPING SEROLOGIC RH(D): CPT

## 2021-09-10 PROCEDURE — 87340 HEPATITIS B SURFACE AG IA: CPT

## 2021-09-10 PROCEDURE — 86803 HEPATITIS C AB TEST: CPT

## 2021-09-10 PROCEDURE — 36415 COLL VENOUS BLD VENIPUNCTURE: CPT

## 2021-09-10 PROCEDURE — 87389 HIV-1 AG W/HIV-1&-2 AB AG IA: CPT

## 2021-09-10 PROCEDURE — 87086 URINE CULTURE/COLONY COUNT: CPT

## 2021-09-10 PROCEDURE — 85027 COMPLETE CBC AUTOMATED: CPT

## 2021-09-10 PROCEDURE — 76801 OB US < 14 WKS SINGLE FETUS: CPT | Performed by: OBSTETRICS & GYNECOLOGY

## 2021-09-10 PROCEDURE — 86900 BLOOD TYPING SEROLOGIC ABO: CPT

## 2021-09-11 LAB
BACTERIA UR CULT: NO GROWTH
T PALLIDUM AB SER QL: NONREACTIVE

## 2021-09-13 ENCOUNTER — TELEPHONE (OUTPATIENT)
Dept: OBGYN | Facility: CLINIC | Age: 36
End: 2021-09-13

## 2021-09-13 DIAGNOSIS — O21.9 VOMITING OR NAUSEA OF PREGNANCY: Primary | ICD-10-CM

## 2021-09-13 LAB
HBV SURFACE AG SERPL QL IA: NONREACTIVE
HCV AB SERPL QL IA: NONREACTIVE
HIV 1+2 AB+HIV1 P24 AG SERPL QL IA: NONREACTIVE
RUBV IGG SERPL QL IA: 1.27 INDEX
RUBV IGG SERPL QL IA: POSITIVE

## 2021-09-13 RX ORDER — ONDANSETRON 8 MG/1
8 TABLET, FILM COATED ORAL EVERY 8 HOURS PRN
Qty: 30 TABLET | Refills: 4 | Status: SHIPPED | OUTPATIENT
Start: 2021-09-13 | End: 2022-03-29

## 2021-09-13 RX ORDER — METOCLOPRAMIDE 10 MG/1
10 TABLET ORAL 4 TIMES DAILY PRN
Qty: 30 TABLET | Refills: 4 | Status: SHIPPED | OUTPATIENT
Start: 2021-09-13 | End: 2022-03-29

## 2021-09-13 NOTE — TELEPHONE ENCOUNTER
Please advise reglan and zofran called in, she can try either or both and see what helps for her.   Dr. Jessika James, DO    Obstetrics and Gynecology  [facility] [place] [facility] [place]

## 2021-09-13 NOTE — TELEPHONE ENCOUNTER
11w3d      Pt taking b6 and unisom.  Still having N&V.    Would like rx sent.    Jacki POSEY R.N.

## 2021-09-22 ENCOUNTER — PRENATAL OFFICE VISIT (OUTPATIENT)
Dept: OBGYN | Facility: CLINIC | Age: 36
End: 2021-09-22
Payer: COMMERCIAL

## 2021-09-22 VITALS
DIASTOLIC BLOOD PRESSURE: 60 MMHG | WEIGHT: 179.2 LBS | BODY MASS INDEX: 27.16 KG/M2 | HEIGHT: 68 IN | SYSTOLIC BLOOD PRESSURE: 116 MMHG

## 2021-09-22 DIAGNOSIS — K21.00 GASTROESOPHAGEAL REFLUX DISEASE WITH ESOPHAGITIS WITHOUT HEMORRHAGE: ICD-10-CM

## 2021-09-22 DIAGNOSIS — Z34.91 PRENATAL CARE IN FIRST TRIMESTER: Primary | ICD-10-CM

## 2021-09-22 PROCEDURE — G0145 SCR C/V CYTO,THINLAYER,RESCR: HCPCS | Performed by: FAMILY MEDICINE

## 2021-09-22 PROCEDURE — 99207 PR FIRST OB VISIT: CPT | Performed by: FAMILY MEDICINE

## 2021-09-22 PROCEDURE — 87591 N.GONORRHOEAE DNA AMP PROB: CPT | Performed by: FAMILY MEDICINE

## 2021-09-22 PROCEDURE — 87624 HPV HI-RISK TYP POOLED RSLT: CPT | Performed by: FAMILY MEDICINE

## 2021-09-22 PROCEDURE — 87491 CHLMYD TRACH DNA AMP PROBE: CPT | Performed by: FAMILY MEDICINE

## 2021-09-22 RX ORDER — OMEPRAZOLE 40 MG/1
40 CAPSULE, DELAYED RELEASE ORAL DAILY
Qty: 90 CAPSULE | Refills: 3 | Status: SHIPPED | OUTPATIENT
Start: 2021-09-22 | End: 2024-04-20

## 2021-09-22 ASSESSMENT — MIFFLIN-ST. JEOR: SCORE: 1538.41

## 2021-09-22 NOTE — NURSING NOTE
"12w5d    Chief Complaint   Patient presents with     Prenatal Care     NPN--pap due--GC due--labs and US 9/10/21--having heartburn but nausea is better--taking medication to help       Initial /60   Ht 1.715 m (5' 7.5\")   Wt 81.3 kg (179 lb 3.2 oz)   LMP 2021   BMI 27.65 kg/m   Estimated body mass index is 27.65 kg/m  as calculated from the following:    Height as of this encounter: 1.715 m (5' 7.5\").    Weight as of this encounter: 81.3 kg (179 lb 3.2 oz).  BP completed using cuff size: regular    Questioned patient about current smoking habits.  Pt. has never smoked.          The following  Due: pap smear  Gloria ()         "

## 2021-09-22 NOTE — PROGRESS NOTES
Denzel Tripp is a 36 year old  @ 12w5d wks EGA with 2022 who presents to the clinic for an new ob visit.         Estimated Date of Delivery: 2022  Reviewed nurse intake visit on previously  H/o Chicken Pox or Varicella Vaccination: Yes     History of GDM: no   Hx PTL : no   History of HTN in pregnancy: no   Shoulder dystocia: no   Vacuum Extraction: no   PPH: no   3rd of 4th degree laceration: no   Other complications: no      Patient Active Problem List   Diagnosis     CARDIOVASCULAR SCREENING; LDL GOAL LESS THAN 160     Vaginal delivery     Routine postpartum follow-up     GBS (group B streptococcus) UTI complicating pregnancy     Amniotic fluid leaking     Vitamin D deficiency     Indication for care in labor or delivery     Ganglion cyst     Palpitations     Gastroesophageal reflux disease, esophagitis presence not specified     Past Medical History:   Diagnosis Date     No pertinent past medical history      Past Surgical History:   Procedure Laterality Date     REVISE CIRCUMCISION FEMALE       Current Outpatient Medications   Medication Sig Dispense Refill     metoclopramide (REGLAN) 10 MG tablet Take 1 tablet (10 mg) by mouth 4 times daily as needed 30 tablet 4     ondansetron (ZOFRAN) 8 MG tablet Take 1 tablet (8 mg) by mouth every 8 hours as needed for nausea 30 tablet 4     acetaminophen (TYLENOL) 325 MG tablet Take 325-650 mg by mouth as needed for mild pain (Patient not taking: Reported on 2021)       omeprazole (PRILOSEC) 20 MG CR capsule Take 1 capsule (20 mg) by mouth daily (Patient not taking: Reported on 2021) 30 capsule 1         ====================================================  PERSONAL/SOCIAL HISTORY  Social History     Socioeconomic History     Marital status:      Spouse name: Padma     Number of children: 4     Years of education: None     Highest education level: None   Occupational History     Occupation: [employer] worker   Tobacco Use     Smoking  status: Never Smoker     Smokeless tobacco: Never Used   Substance and Sexual Activity     Alcohol use: No     Drug use: No     Sexual activity: Yes     Partners: Male   Other Topics Concern     Parent/sibling w/ CABG, MI or angioplasty before 65F 55M? Not Asked   Social History Narrative     None     Social Determinants of Health     Financial Resource Strain:      Difficulty of Paying Living Expenses:    Food Insecurity:      Worried About Running Out of Food in the Last Year:      Ran Out of Food in the Last Year:    Transportation Needs:      Lack of Transportation (Medical):      Lack of Transportation (Non-Medical):    Physical Activity:      Days of Exercise per Week:      Minutes of Exercise per Session:    Stress:      Feeling of Stress :    Social Connections:      Frequency of Communication with Friends and Family:      Frequency of Social Gatherings with Friends and Family:      Attends Taoist Services:      Active Member of Clubs or Organizations:      Attends Club or Organization Meetings:      Marital Status:    Intimate Partner Violence:      Fear of Current or Ex-Partner:      Emotionally Abused:      Physically Abused:      Sexually Abused:      =====================================================   REVIEW OF SYSTEMS  ENT: NEGATIVE for ear, mouth and throat problems  CV: NEGATIVE for chest pain, palpitations or peripheral edema  GI: NEGATIVE for nausea, abdominal pain, heartburn, or change in bowel habits  : NEGATIVE for unusual urinary or vaginal symptoms. Periods are regular.  C: NEGATIVE for fever, chills, change in weight  I: NEGATIVE for worrisome rashes, moles or lesions  E: NEGATIVE for vision changes or irritation  R: NEGATIVE for significant cough or SOB  B: NEGATIVE for masses, tenderness or discharge  M: NEGATIVE for significant arthralgias or myalgia  N: NEGATIVE for weakness, dizziness or paresthesias  P: NEGATIVE for changes in mood or  "affect  ====================================================  PHYSICAL EXAM:  /60   Ht 1.715 m (5' 7.5\")   Wt 81.3 kg (179 lb 3.2 oz)   LMP 2021   BMI 27.65 kg/m          GENERAL:  Pleasant pregnant female, alert, well groomed.  SKIN:  Warm and dry, without lesions or rashes  HEAD: Symmetrical features.  EYES:  PERRLA,   MOUTH:  Buccal mucosa pink, moist without lesions.    NECK:  Thyroid without enlargement and nodules.  Lymph nodes not palpable.   LUNGS:  Clear to auscultation.  BREAST:  Symmetrical.  No dominant, fixed or suspicious masses are noted.  No skin or nipple changes or axillary nodes.  Self exam is taught and encouraged.  Nipples everted.      HEART:  RRR without murmur.  ABDOMEN: Soft without masses , tenderness or organomegaly.  No CVA tenderness. No scars noted..   MUSCULOSKELETAL:  Full range of motion  EXTREMITIES:  No edema. No significant varicosities.   GENITALIA:  BUS WNL, no lesions noted   VAGINA:  Pink, normal rugae and discharge normal and physiologic,   CERVIX:  smooth, without discharge or CMT and parous os,   firm/ closed 4 cm long.  UTERUS: Anteverted, nontender 12 weeks in size.  ADNEXA: Without masses or tenderness  PELVIS:   Adequate, Pelvis proven to 7 pounds 15 ounces.    =========================================  ICSI Routine Prenatal Carfe Guideline  ASSESSMENT/PLAN:  Denzel Tripp is a 36 year old  @ 12w5d  wks EGA with EDC Estimated Date of Delivery: 2022 who presents to the clinic for an new ob visit.      1) Concerns: GERD: prilosec called in     Nausea taking reglan   Covid-19 concerns: covid infection and vaccination recommendations discussed.   2) Routine: Blood type A+ RI tdap [ ] flu [ ] GS [declined] level II us [ ]   Covid v [decline] GTT [ ]  3) Risk factors:   A: AMA: level II us   4) Problem list   Patient Active Problem List   Diagnosis     CARDIOVASCULAR SCREENING; LDL GOAL LESS THAN 160     Vaginal delivery     Routine " postpartum follow-up     GBS (group B streptococcus) UTI complicating pregnancy     Amniotic fluid leaking     Vitamin D deficiency     Indication for care in labor or delivery     Ganglion cyst     Palpitations     Gastroesophageal reflux disease, esophagitis presence not specified     5) EDUCATION :    RECOMMENDED WEIGHT GAIN: 25-35 lbs.  Instructed on best evidence for: weight gain for her BMI for pregnancy; healthy diet and foods to avoid; exercise and activity during pregnancy;avoiding exposure to toxoplasmosis; and maintenance of a generally healthy lifestyle.   Discussed the harms, benefits, side effects and alternative therapies for current prescribed and OTC medications: patient previously given list of recommended medications.  6) Counseled about genetic screening tests (Innatal, preparent with options, discussed standard panel and other options, 1st trimester screen and targeted anatomy scan and AFP and quad screen if first trimester screening not done) and invasive definitive testing (chorionic villus sampling and genetic amniocentesis).  Patient wishes to undergo level II us     7) Return: 4 weeks     Dr. Jessika James, DO    OB/GYN   [facility]

## 2021-09-22 NOTE — PATIENT INSTRUCTIONS
"Return weeks  Return to clinic:  every 4 weeks till 28 weeks, then every 2 weeks till 36 weeks, then weekly till delivery      Phone numbers [place]:  Day/ night [phone] ask for ob triage  Emergency:  Call labor and delivery:  [phone]    What should I call about??    Contraction every 5 minutes for 1 hour 1 minute long (511), bleeding, loss of fluid, headache that doesn't resolve with tylenol, and decreased fetal movement     Start kick counts @ 26-28 weeks   There is an melani for this!  It is called \"count the kicks\"  Keep track of movement and discover your normal baby movement pattern   guideline is listed below  Please call if you do not feel the baby move!  We will have you come in for fetal heart rate monitoring:   Perception of at least 10 FMs during 12 hours of normal maternal activity   Perception of least 10 FMs over two hours when the mother is at rest and focused on [facility] Address   [address], [place], [place] [address] [phone]    Dr. Jessika James, DO    OB/GYN   [facility]                                                      "

## 2021-09-23 LAB
C TRACH DNA SPEC QL NAA+PROBE: NEGATIVE
N GONORRHOEA DNA SPEC QL NAA+PROBE: NEGATIVE

## 2021-09-24 LAB
BKR LAB AP GYN ADEQUACY: NORMAL
BKR LAB AP GYN INTERPRETATION: NORMAL
BKR LAB AP HPV REFLEX: NORMAL
BKR LAB AP PREVIOUS ABNORMAL: NORMAL
PATH REPORT.COMMENTS IMP SPEC: NORMAL
PATH REPORT.RELEVANT HX SPEC: NORMAL

## 2021-09-27 LAB
HUMAN PAPILLOMA VIRUS 16 DNA: NEGATIVE
HUMAN PAPILLOMA VIRUS 18 DNA: NEGATIVE
HUMAN PAPILLOMA VIRUS FINAL DIAGNOSIS: NORMAL
HUMAN PAPILLOMA VIRUS OTHER HR: NEGATIVE

## 2021-10-09 ENCOUNTER — OFFICE VISIT (OUTPATIENT)
Dept: URGENT CARE | Facility: URGENT CARE | Age: 36
End: 2021-10-09
Payer: COMMERCIAL

## 2021-10-09 VITALS
OXYGEN SATURATION: 98 % | TEMPERATURE: 98 F | SYSTOLIC BLOOD PRESSURE: 125 MMHG | RESPIRATION RATE: 20 BRPM | DIASTOLIC BLOOD PRESSURE: 78 MMHG | HEART RATE: 80 BPM

## 2021-10-09 DIAGNOSIS — R05.9 COUGH: Primary | ICD-10-CM

## 2021-10-09 DIAGNOSIS — J02.9 ACUTE SORE THROAT: ICD-10-CM

## 2021-10-09 LAB — DEPRECATED S PYO AG THROAT QL EIA: NEGATIVE

## 2021-10-09 PROCEDURE — U0005 INFEC AGEN DETEC AMPLI PROBE: HCPCS | Performed by: FAMILY MEDICINE

## 2021-10-09 PROCEDURE — 87651 STREP A DNA AMP PROBE: CPT | Performed by: FAMILY MEDICINE

## 2021-10-09 PROCEDURE — U0003 INFECTIOUS AGENT DETECTION BY NUCLEIC ACID (DNA OR RNA); SEVERE ACUTE RESPIRATORY SYNDROME CORONAVIRUS 2 (SARS-COV-2) (CORONAVIRUS DISEASE [COVID-19]), AMPLIFIED PROBE TECHNIQUE, MAKING USE OF HIGH THROUGHPUT TECHNOLOGIES AS DESCRIBED BY CMS-2020-01-R: HCPCS | Performed by: FAMILY MEDICINE

## 2021-10-09 PROCEDURE — 99203 OFFICE O/P NEW LOW 30 MIN: CPT | Performed by: FAMILY MEDICINE

## 2021-10-09 RX ORDER — AMOXICILLIN 500 MG/1
500 CAPSULE ORAL 3 TIMES DAILY
Qty: 30 CAPSULE | Refills: 0 | Status: SHIPPED | OUTPATIENT
Start: 2021-10-09 | End: 2021-10-19

## 2021-10-09 NOTE — PATIENT INSTRUCTIONS
"Gargle warm saltwater to soothe the sore throat.      Drink ice-cold water to decrease the pain in the back of the throat      Take Tylenol for the pain.      Go to the emergency room if you develop severe, worsening shortness of breath.      Rest as much as possible.      follow up if not better in 5 days.       Patient Education   After Your COVID-19 (Coronavirus) Test  You have been tested for COVID-19 (coronavirus).   If you'll have surgery in the next few days, we'll let you know ahead of time if you have the virus. Please call your surgeon's office with any questions.  For all other patients: Results are usually available in [facility] within 2 to 3 days.   If you do not have a [employer] account, you'll get a letter in the mail in about 7 to 10 days.   [employer] is often the fastest way to get test results. Please sign up if you do not already have a [facility] account. See the handout Getting COVID-19 Test Results in [facility] for help.  What if my test result is positive?  If your test is positive and you have not viewed your result in [facility], you'll get a phone call with your result. (A positive test means that you have the virus.)     Follow the tips under \"How do I self-isolate?\" below for 10 days (20 days if you have a weak immune system).    You don't need to be retested for COVID-19 before going back to school or work. As long as you're fever-free and feeling better, you can go back to school, work and other activities after waiting the 10 or 20 days.  What if I have questions after I get my results?  If you have questions about your results, please visit our testing website at [email or web address].   After 3 days, if you have not gotten your results:     Call [phone] ([phone]) and ask to speak with our COVID-19 results team.    If you're being treated at an infusion center: Call your infusion center directly.  What are the symptoms of COVID-19?  Cough, fever and " "trouble breathing are the most common signs of COVID-19.  Other symptoms can include new headaches, new muscle or body aches, new and unexplained fatigue (feeling very tired), chills, sore throat, congestion (stuffy or runny nose), diarrhea (loose poop), loss of taste or smell, belly pain, and nausea or vomiting (feeling sick to your stomach or throwing up).  You may already have symptoms of COVID-19, or they may show up later.  What should I do if I have symptoms?  If you're having surgery: Call your surgeon's office.  For all other patients: Stay home and away from others (self-isolate) until ...    You've had no fever--and no medicine that reduces fever--for 1 full day (24 hours), AND    Other symptoms have gotten better. For example, your cough or breathing has improved, AND    At least 10 days have passed since your symptoms first started.  How do I self-isolate?    Stay in your own room, even for meals. Use your own bathroom if you can.    Stay away from others in your home. No hugging, kissing or shaking hands. No visitors.    Don't go to work, school or anywhere else.    Clean \"high touch\" surfaces often (doorknobs, counters, handles). Use household cleaning spray or wipes. You'll find a full list of  on the EPA website: www.epa.gov/pesticide-registration/list-n-disinfectants-use-against-sars-cov-2.    Cover your mouth and nose with a mask or other face covering to avoid spreading germs.    Wash your hands and face often. Use soap and water.    Caregivers in these groups are at risk for severe illness due to COVID-19:  ? People 65 years and older  ? People who live in a nursing home or long-term care facility  ? People with chronic disease (lung, heart, cancer, diabetes, kidney, liver, immunologic)  ? People who have a weakened immune system, including those who:    Are in cancer treatment    Take medicine that weakens the immune system, such as corticosteroids    Had a bone marrow or organ " transplant    Have an immune deficiency    Have poorly controlled HIV or AIDS    Are obese (body mass index of 40 or higher)    Smoke regularly    Caregivers should wear gloves while washing dishes, handling laundry and cleaning bedrooms and bathrooms.    Use caution when washing and drying laundry: Don't shake dirty laundry and use the warmest water setting that you can.    For more tips on managing your health at home, go to www.cdc.gov/coronavirus/2019-ncov/downloads/10Things.pdf.  How can I take care of myself at home?  1. Get lots of rest. Drink extra fluids (unless a doctor has told you not to).  2. Take Tylenol (acetaminophen) for fever or pain. If you have liver or kidney problems, ask your family doctor if it's OK to take Tylenol.   Adults can take either:  ? 650 mg (two 325 mg pills) every 4 to 6 hours, or   ? 1,000 mg (two 500 mg pills) every 8 hours as needed.  ? Note: Don't take more than 3,000 mg in one day. Acetaminophen is found in many medicines (both prescribed and over-the-counter medicines). Read all labels to be sure you don't take too much.   For children, check the Tylenol bottle for the right dose. The dose is based on the child's age or weight.  3. If you have other health problems (like cancer, heart failure, an organ transplant or severe kidney disease): Call your specialty clinic if you don't feel better in the next 2 days.  4. Know when to call 911. Emergency warning signs include:  ? Trouble breathing or shortness of breath  ? Chest pain or pressure that doesn't go away  ? Feeling confused like you haven't felt before, or not being able to wake up  ? Bluish-colored lips or face  5. If your doctor prescribed a blood thinner medicine: Follow their instructions.  Where can I get more information?     [employer] [place] - About COVID-19:   [email or web address]    CDC - If You're Sick: cdc.gov/coronavirus/2019-ncov/about/steps-when-sick.html    CDC - Ending Home Isolation:  www.cdc.gov/coronavirus/2019-ncov/hcp/disposition-in-home-patients.html    CDC - Caring for Someone: www.cdc.gov/coronavirus/2019-ncov/if-you-are-sick/care-for-someone.html    [facility] - Interim Guidance for Hospital Discharge to Home: www.health.[facility].[place]/diseases/coronavirus/hcp/hospdischarge.pdf    [facility] clinical trials (COVID-19 research studies): clinicalaffairs.[facility]-clinical-trials    Below are the COVID-19 hotlines at the [place] Department of Health ([facility]). Interpreters are available.  ? For health questions: Call 049-895-9662 or 1-704.291.5973 (7 a.m. to 7 p.m.)  ? For questions about schools and childcare: Call 451-870-3108 or 1-667.538.5564 (7 a.m. to 7 p.m.)    For informational purposes only. Not to replace the advice of your health care provider. Clinically reviewed by Infection Prevention and [facility] COVID-19 Clinical Team. Copyright   2020 [facility]. All rights reserved. [employer] 618333 - Rev 11/11/20.

## 2021-10-09 NOTE — PROGRESS NOTES
SUBJECTIVE:   Denzel Tripp is a 36 year old female --she is 15 weeks, 1 day gestation--presenting with a chief complaint of sore throat (severe sore throat, worse with swallowing), cough (a lot of dry cough), stuffy nose, chills, headaches at the temples (only with coughing)  Onset of symptoms was two days ago.  .    Current and Associated symptoms: as listed above. .  Predisposing factors include Her son tested positive for strep throat.  Her son also has strep throat.  .    No close contacts with COVID-19    Patient has not received COVID-19 vaccine   No fatigue  No fevers  No shortness of breath  There is chest pain only with cough  No loss of smell and taste    No body aches  No darker lips/toes/fingers.    No vomiting  No diarrhea  No abdominal pain    Past Medical History:   Diagnosis Date     No pertinent past medical history      Current Outpatient Medications   Medication Sig Dispense Refill     acetaminophen (TYLENOL) 325 MG tablet Take 325-650 mg by mouth as needed for mild pain (Patient not taking: Reported on 8/30/2021)       metoclopramide (REGLAN) 10 MG tablet Take 1 tablet (10 mg) by mouth 4 times daily as needed (Patient not taking: Reported on 10/9/2021) 30 tablet 4     omeprazole (PRILOSEC) 20 MG CR capsule Take 1 capsule (20 mg) by mouth daily (Patient not taking: Reported on 8/30/2021) 30 capsule 1     omeprazole (PRILOSEC) 40 MG DR capsule Take 1 capsule (40 mg) by mouth daily (Patient not taking: Reported on 10/9/2021) 90 capsule 3     ondansetron (ZOFRAN) 8 MG tablet Take 1 tablet (8 mg) by mouth every 8 hours as needed for nausea (Patient not taking: Reported on 10/9/2021) 30 tablet 4     Social History     Tobacco Use     Smoking status: Never Smoker     Smokeless tobacco: Never Used   Substance Use Topics     Alcohol use: No       ROS:  CONSTITUTIONAL:positive for chills.   INTEGUMENTARY/SKIN: no darker lips/toes/fingers.    ENT/MOUTH: positive for stuffy nose, sore throat.     RESP:positive for cough.   GI: no vomiting.      OBJECTIVE:  /78   Pulse 80   Temp 98  F (36.7  C) (Tympanic)   Resp 20   LMP 07/09/2021   SpO2 98%   GENERAL APPEARANCE: healthy, alert and no distress.  Voice is hoarse.  No acute respiratory distress. Not much cough.    HENT: oropharynx is moist and mildly erythematous without exudates.    NECK: supple, nontender, no lymphadenopathy  RESP: lungs clear to auscultation - no rales, rhonchi or wheezes  CV: regular rates and rhythm, normal S1 S2, no murmur noted  SKIN: no suspicious lesions or rashes.  NO cyanosis.      LAB:    Results for orders placed or performed in visit on 10/09/21   Streptococcus A Rapid Screen w/Reflex to PCR     Status: Normal    Specimen: Throat; Swab   Result Value Ref Range    Group A Strep antigen Negative Negative         ASSESSMENT:  Cough  Sore Throat    PLAN:  Gargle warm saltwater to soothe the sore throat.      Drink ice-cold water to decrease the pain in the back of the throat      Take Tylenol for the pain.      Go to the emergency room if you develop severe, worsening shortness of breath.      Rest as much as possible.      follow up if not better in 5 days.     Rx:  Amoxicillin (patient's sons are both positive for strep throat)  .    Pending lab:  COVID-19 test, Strep PCR test.  Patient does not have MyChart access.      Cesar Caceres MD

## 2021-10-10 LAB
GROUP A STREP BY PCR: NOT DETECTED
SARS-COV-2 RNA RESP QL NAA+PROBE: NEGATIVE

## 2021-10-11 ENCOUNTER — NURSE TRIAGE (OUTPATIENT)
Dept: NURSING | Facility: CLINIC | Age: 36
End: 2021-10-11

## 2021-10-11 NOTE — TELEPHONE ENCOUNTER
and Wife calling for tested results     Coronavirus (COVID-19) Notification    Lab Result   Lab test 2019-nCoV rRt-PCR OR SARS-COV-2 PCR    Nasopharyngeal AND/OR Oropharyngeal swab is NEGATIVE for 2019-nCoV RNA [OR] SARS-COV-2 RNA (COVID-19) RNA    Your result was negative. This means that we didn't find the virus that causes COVID-19 in your sample. A test may show negative when you do actually have the virus. This can happen when the virus is in the early stages of infection, before you feel illness symptoms.    If you have symptoms   Stay home and away from others (self-isolate) until you meet ALL of the guidelines below:    You've had no fever--and no medicine that reduces fever--for 1 full day (24 hours). And      Your other symptoms have gotten better. For example, your cough or breathing has improved. And   ; At least 10 days have passed since your symptoms started. (If you've been told by a doctor that you have a weak immune system, wait 20 days.)         During this time:    Stay home. Don't go to work, school or anywhere else.     Stay in your own room, including for meals. Use your own bathroom if you can.    Stay away from others in your home. No hugging, kissing or shaking hands. No visitors.    Clean  high touch  surfaces often (doorknobs, counters, handles, etc.). Use a household cleaning spray or wipes. You can find a full list on the EPA website at www.epa.gov/pesticide-registration/list-n-disinfectants-use-against-sars-cov-2.    Cover your mouth and nose with a mask, tissue or other face covering to avoid spreading germs.    Wash your hands and face often with soap and water.    Going back to work  Check with your employer for any guidelines to follow for going back to work.  You are sent a letter for your Employer which will serve as formal document notice that you, the employee, tested negative for COVID-19, as of the testing date shown above.    If your symptoms worsen or other  concerning symptoms, contact PCP, oncare or consider returning to Emergency Dept.    Where can I get more information?     [employer] [place]: [email or web address]    Coronavirus Basics: www.health.[facility].mn.us/diseases/coronavirus/basics.html    [facility] Hotline (444-184-6801)  They both declined triaging for symptoms Pt still with a cough - they declined a F/u appt with OB/GYN - she is pregnant and she is taking antibiotic.  Madelaine Mccord RN

## 2021-11-04 ENCOUNTER — PRENATAL OFFICE VISIT (OUTPATIENT)
Dept: OBGYN | Facility: CLINIC | Age: 36
End: 2021-11-04
Payer: COMMERCIAL

## 2021-11-04 VITALS
DIASTOLIC BLOOD PRESSURE: 64 MMHG | WEIGHT: 177.9 LBS | HEIGHT: 68 IN | BODY MASS INDEX: 26.96 KG/M2 | SYSTOLIC BLOOD PRESSURE: 110 MMHG

## 2021-11-04 DIAGNOSIS — R35.0 URINARY FREQUENCY: ICD-10-CM

## 2021-11-04 DIAGNOSIS — O09.522 MULTIGRAVIDA OF ADVANCED MATERNAL AGE IN SECOND TRIMESTER: Primary | ICD-10-CM

## 2021-11-04 LAB
ALBUMIN UR-MCNC: NEGATIVE MG/DL
APPEARANCE UR: CLEAR
BACTERIA #/AREA URNS HPF: ABNORMAL /HPF
BILIRUB UR QL STRIP: NEGATIVE
COLOR UR AUTO: YELLOW
GLUCOSE UR STRIP-MCNC: NEGATIVE MG/DL
HGB UR QL STRIP: NEGATIVE
KETONES UR STRIP-MCNC: 40 MG/DL
LEUKOCYTE ESTERASE UR QL STRIP: ABNORMAL
NITRATE UR QL: NEGATIVE
PH UR STRIP: 8.5 [PH] (ref 5–7)
RBC #/AREA URNS AUTO: ABNORMAL /HPF
SP GR UR STRIP: 1.02 (ref 1–1.03)
SQUAMOUS #/AREA URNS AUTO: ABNORMAL /LPF
UROBILINOGEN UR STRIP-ACNC: 1 E.U./DL
WBC #/AREA URNS AUTO: ABNORMAL /HPF

## 2021-11-04 PROCEDURE — 99207 PR PRENATAL VISIT: CPT | Performed by: FAMILY MEDICINE

## 2021-11-04 PROCEDURE — 81001 URINALYSIS AUTO W/SCOPE: CPT | Performed by: FAMILY MEDICINE

## 2021-11-04 RX ORDER — NITROFURANTOIN 25; 75 MG/1; MG/1
100 CAPSULE ORAL 2 TIMES DAILY
Qty: 14 CAPSULE | Refills: 0 | Status: SHIPPED | OUTPATIENT
Start: 2021-11-04 | End: 2021-11-11

## 2021-11-04 ASSESSMENT — MIFFLIN-ST. JEOR: SCORE: 1532.51

## 2021-11-04 NOTE — PATIENT INSTRUCTIONS
"Return 4 weeks   MFM will call to schedule the ultrasound  Return to clinic:  every 4 weeks till 28 weeks, then every 2 weeks till 36 weeks, then weekly till delivery      Phone numbers [place]:  Day/ night [phone] ask for ob triage  Emergency:  Call labor and delivery:  [phone]    What should I call about??    Contraction every 5 minutes for 1 hour 1 minute long (511), bleeding, loss of fluid, headache that doesn't resolve with tylenol, and decreased fetal movement     Start kick counts @ 26-28 weeks   There is an melani for this!  It is called \"count the kicks\"  Keep track of movement and discover your normal baby movement pattern   guideline is listed below  Please call if you do not feel the baby move!  We will have you come in for fetal heart rate monitoring:   Perception of at least 10 FMs during 12 hours of normal maternal activity   Perception of least 10 FMs over two hours when the mother is at rest and focused on [facility] Address   [address], [place], [place] [address] [phone]    Dr. Jessika James, DO    OB/GYN   [facility]                                                      "

## 2021-11-04 NOTE — NURSING NOTE
"18w6d    Chief Complaint   Patient presents with     Prenatal Care     discuss what medication to take       Initial /64   Ht 1.715 m (5' 7.5\")   Wt 80.7 kg (177 lb 14.4 oz)   LMP 2021   BMI 27.45 kg/m   Estimated body mass index is 27.45 kg/m  as calculated from the following:    Height as of this encounter: 1.715 m (5' 7.5\").    Weight as of this encounter: 80.7 kg (177 lb 14.4 oz).  BP completed using cuff size: regular    Questioned patient about current smoking habits.  Pt. has never smoked.          The following HM Due: NONE      "

## 2021-11-04 NOTE — PROGRESS NOTES
"CC: Here for routine prenatal visit   36 year old y/o  @ 18w6d with Estimated Date of Delivery: 2022     /64   Ht 1.715 m (5' 7.5\")   Wt 80.7 kg (177 lb 14.4 oz)   LMP 2021   BMI 27.45 kg/m     See OB flowsheet  + fetal movement, no contractions, no bleeding, no loss of fluid   Discussed monitoring fetal movement     1) concerns: discussed ultrasound with MFM, which has been ordered.    Covid-19 concerns: covid infection and vaccination recommendations discussed.   2) Routine: Blood type A+ RI tdap [ ] flu [ ] GS [declined] level II us [ ]   Covid v [decline] GTT [ ]  3) Risk factors:   A: AMA: level II us ordered today     4) Return: 4 weeks   Urinary frequency,  Check UA     [place]     "

## 2021-11-05 ENCOUNTER — TRANSCRIBE ORDERS (OUTPATIENT)
Dept: MATERNAL FETAL MEDICINE | Facility: CLINIC | Age: 36
End: 2021-11-05

## 2021-11-05 DIAGNOSIS — O26.90 PREGNANCY RELATED CONDITION, ANTEPARTUM: Primary | ICD-10-CM

## 2021-11-12 ENCOUNTER — PRE VISIT (OUTPATIENT)
Dept: MATERNAL FETAL MEDICINE | Facility: CLINIC | Age: 36
End: 2021-11-12
Payer: COMMERCIAL

## 2021-11-17 ENCOUNTER — OFFICE VISIT (OUTPATIENT)
Dept: MATERNAL FETAL MEDICINE | Facility: CLINIC | Age: 36
End: 2021-11-17
Attending: FAMILY MEDICINE
Payer: COMMERCIAL

## 2021-11-17 ENCOUNTER — HOSPITAL ENCOUNTER (OUTPATIENT)
Dept: ULTRASOUND IMAGING | Facility: CLINIC | Age: 36
End: 2021-11-17
Attending: FAMILY MEDICINE
Payer: COMMERCIAL

## 2021-11-17 DIAGNOSIS — O26.90 PREGNANCY RELATED CONDITION, ANTEPARTUM: ICD-10-CM

## 2021-11-17 DIAGNOSIS — O09.522 SUPERVISION OF ELDERLY MULTIGRAVIDA IN SECOND TRIMESTER: Primary | ICD-10-CM

## 2021-11-17 DIAGNOSIS — O35.9XX0 SUSPECTED FETAL ANOMALY, ANTEPARTUM, SINGLE OR UNSPECIFIED FETUS: Primary | ICD-10-CM

## 2021-11-17 PROCEDURE — 76811 OB US DETAILED SNGL FETUS: CPT | Mod: 26 | Performed by: OBSTETRICS & GYNECOLOGY

## 2021-11-17 PROCEDURE — 96040 HC GENETIC COUNSELING, EACH 30 MINUTES: CPT | Performed by: GENETIC COUNSELOR, MS

## 2021-11-17 PROCEDURE — 76811 OB US DETAILED SNGL FETUS: CPT

## 2021-11-17 NOTE — PROGRESS NOTES
[facility]  Genetic Counseling Consult    Patient:  Denzel Tripp YOB: 1985   Date of Service:  21      Denzel Tripp was seen at the [facility] for genetic consultation as part of her appointment for comprehensive ultrasound.  The indication for genetic counseling is advanced maternal age. She was unaccompanied to this visit.     The encounter was conducted with an ipad clinovo  (#[id number]) due to the patient speaking limited english.       Impression/Plan:   1. Denzel has declined serum screening in this pregnancy.    2. Denzel had a comprehensive (level II) ultrasound today.  Please see the ultrasound report for further details.    3. The patient declines genetic amniocentesis and maternal serum screening today.    Pregnancy History:   /Parity:    Age at Delivery: 36 year old  CARROLL: 2022, by Ultrasound   Gestational Age: 20w5d    No significant complications or exposures were reported in the current pregnancy.    Denzel mcleod pregnancy history is significant for four full term deliveries with no complications per patient report    Medical History:   Denzel mcleod reported medical history is not expected to impact pregnancy management or risks to fetal development.       Family History:   A three-generation pedigree was obtained, and is scanned under the  Media  tab.   The following significant findings were reported by Denzel:    The father of the pregnancy, Padma, is healthy. He has a diagnosis of type I diabetes   Diabetes is inherited in a multifactorial fashion, meaning many factors are involved in the development of this condition. These factors usually include both genetic and environmental aspects and a combination of these aspects lead to the condition.    For men with type I diabetes their children have a 1 in 17 chance of developing diabetes during their lifetime. However, for woman with type I diabetes,  if their child is born before 25 there is a 1 in 25 risk and if their child was born after 25 there is a 1 in 100 risk. If a parent develops diabetes before age 11, their child's risk is typically doubled. Furthermore, if both parents have type I diabetes, the risk is between 1 in 10 to 1 in 4. These risk numbers are approximate and can be complicated by other factors such as autoimmune disorders.       Denzel and Padma have two healthy daughters and two healthy sons       Denzel and Padma may be related but Denzel thinks it is quite distant and knows they are not first cousins.       Otherwise, the reported family history is negative for multiple miscarriages, stillbirths, birth defects, mental retardation, known genetic conditions, and consanguinity.       Carrier Screening:   The patient reports that she and the father of the pregnancy have  ancestry:      The hemoglobinopathies are a group of genetic blood diseases that occur with increased frequency in individuals of  ancestry and carrier screening for these conditions is available.  Carrier screening for the hemoglobinopathies includes a CBC with red blood cell indices, a ferritin level, and a quantitative hemoglobin electrophoresis or HPLC.  In addition,  screening in the [facility] includes many of the hemoglobinopathies.      Expanded carrier screening for mutations in a large panel of genes associated with autosomal recessive conditions including cystic fibrosis, spinal muscular atrophy, and others, is now available.      The patient has declined the carrier screening options reviewed today.       Risk Assessment for Chromosome Conditions:   We explained that the risk for fetal chromosome abnormalities increases with maternal age. We discussed specific features of common chromosome abnormalities, including Down syndrome, trisomy 13, trisomy 18, and sex chromosome trisomies.      At age 36 at midtrimester, the risk to have a  baby with Down syndrome is 1 in 168.     At age 36 at midtrimester, the risk to have a baby with any chromosome abnormality is 1 in 82.       Denzel did not have maternal serum screening earlier in pregnancy.       Testing Options:   Denzel declined an depth discussion of testing options for aneuploidy today, and understands that there are testing options available throughout the course of pregnancy if desired.      It was a pleasure to be involved with Denzel s care. Face-to-face time of the meeting was 30 minutes.    Haven King MS, [facility]  Licensed Genetic Counselor   [facility]  Maternal Fetal Medicine  [email or web address]  Office: [phone]  Pager [phone]  MFM: [phone]   Fax: [phone]

## 2021-11-17 NOTE — PROGRESS NOTES
Please see full imaging report from ViewPoint program under imaging tab.    Bridget Liu MD  Maternal Fetal Medicine

## 2021-12-08 ENCOUNTER — HOSPITAL ENCOUNTER (OUTPATIENT)
Dept: ULTRASOUND IMAGING | Facility: CLINIC | Age: 36
End: 2021-12-08
Attending: OBSTETRICS & GYNECOLOGY
Payer: COMMERCIAL

## 2021-12-08 ENCOUNTER — OFFICE VISIT (OUTPATIENT)
Dept: MATERNAL FETAL MEDICINE | Facility: CLINIC | Age: 36
End: 2021-12-08
Attending: OBSTETRICS & GYNECOLOGY
Payer: COMMERCIAL

## 2021-12-08 VITALS — SYSTOLIC BLOOD PRESSURE: 85 MMHG | DIASTOLIC BLOOD PRESSURE: 54 MMHG

## 2021-12-08 DIAGNOSIS — O35.9XX0 SUSPECTED FETAL ANOMALY, ANTEPARTUM, SINGLE OR UNSPECIFIED FETUS: ICD-10-CM

## 2021-12-08 DIAGNOSIS — O36.5990 PREGNANCY AFFECTED BY FETAL GROWTH RESTRICTION: Primary | ICD-10-CM

## 2021-12-08 PROCEDURE — 76820 UMBILICAL ARTERY ECHO: CPT | Mod: 26 | Performed by: OBSTETRICS & GYNECOLOGY

## 2021-12-08 PROCEDURE — 59025 FETAL NON-STRESS TEST: CPT | Mod: 26 | Performed by: OBSTETRICS & GYNECOLOGY

## 2021-12-08 PROCEDURE — 76816 OB US FOLLOW-UP PER FETUS: CPT

## 2021-12-08 PROCEDURE — 76816 OB US FOLLOW-UP PER FETUS: CPT | Mod: 26 | Performed by: OBSTETRICS & GYNECOLOGY

## 2021-12-08 PROCEDURE — 59025 FETAL NON-STRESS TEST: CPT

## 2021-12-08 NOTE — PROGRESS NOTES
Please see full imaging report from ViewPoint program under imaging tab.    FGR - scheduled with MFM weekly.     Bridget Liu MD  Maternal Fetal Medicine

## 2021-12-08 NOTE — NURSING NOTE
Nolan  used via IPAD during MFM appointment. ID #[id number]    NST Performed due to FGR.   reviewed efm tracing. See NST/BPP Doc Flowsheet tab.

## 2021-12-09 ENCOUNTER — PRENATAL OFFICE VISIT (OUTPATIENT)
Dept: OBGYN | Facility: CLINIC | Age: 36
End: 2021-12-09
Payer: COMMERCIAL

## 2021-12-09 VITALS
WEIGHT: 182.9 LBS | DIASTOLIC BLOOD PRESSURE: 60 MMHG | BODY MASS INDEX: 27.72 KG/M2 | HEIGHT: 68 IN | SYSTOLIC BLOOD PRESSURE: 112 MMHG

## 2021-12-09 DIAGNOSIS — O09.522 MULTIGRAVIDA OF ADVANCED MATERNAL AGE IN SECOND TRIMESTER: Primary | ICD-10-CM

## 2021-12-09 PROCEDURE — 99207 PR PRENATAL VISIT: CPT | Performed by: FAMILY MEDICINE

## 2021-12-09 RX ORDER — PRENATAL VIT/IRON FUM/FOLIC AC 27MG-0.8MG
1 TABLET ORAL DAILY
Qty: 90 TABLET | Refills: 3 | Status: SHIPPED | OUTPATIENT
Start: 2021-12-09 | End: 2022-03-10

## 2021-12-09 ASSESSMENT — MIFFLIN-ST. JEOR: SCORE: 1555.19

## 2021-12-09 NOTE — PATIENT INSTRUCTIONS
"Return 4 weeks   Return to clinic:  every 4 weeks till 28 weeks, then every 2 weeks till 36 weeks, then weekly till delivery      Phone numbers [place]:  Day/ night [phone] ask for ob triage  Emergency:  Call labor and delivery:  [phone]    What should I call about??    Contraction every 5 minutes for 1 hour 1 minute long (511), bleeding, loss of fluid, headache that doesn't resolve with tylenol, and decreased fetal movement     Start kick counts @ 26-28 weeks   There is an melani for this!  It is called \"count the kicks\"  Keep track of movement and discover your normal baby movement pattern   guideline is listed below  Please call if you do not feel the baby move!  We will have you come in for fetal heart rate monitoring:   Perception of at least 10 FMs during 12 hours of normal maternal activity   Perception of least 10 FMs over two hours when the mother is at rest and focused on [facility] Address   [address], [place], [place] [address] [phone]    Dr. Jessika James, DO    OB/GYN   [facility]                                                      "

## 2021-12-09 NOTE — NURSING NOTE
"23w6d    Chief Complaint   Patient presents with     Prenatal Care     discuss results from MFM US yesterday--pt has MFM US every week       Initial /60   Ht 1.715 m (5' 7.5\")   Wt 83 kg (182 lb 14.4 oz)   LMP 2021   BMI 28.22 kg/m   Estimated body mass index is 28.22 kg/m  as calculated from the following:    Height as of this encounter: 1.715 m (5' 7.5\").    Weight as of this encounter: 83 kg (182 lb 14.4 oz).  BP completed using cuff size: regular    Questioned patient about current smoking habits.  Pt. has never smoked.          The following HM Due: NONE             "

## 2021-12-09 NOTE — PROGRESS NOTES
"CC: Here for routine prenatal visit   36 year old y/o  @ 23w6d with Estimated Date of Delivery: 2022     /60   Ht 1.715 m (5' 7.5\")   Wt 83 kg (182 lb 14.4 oz)   LMP 2021   BMI 28.22 kg/m       See OB flowsheet  + fetal movement, no contractions, no bleeding, no loss of fluid   Discussed monitoring fetal movement     1) concerns: doing well,   Covid-19 concerns: covid infection and vaccination recommendations discussed.   2) Routine: Blood type A+ RI tdap [ ] flu [ ] GS [declined] level II us [suboptimal views, repeat us with IUGR]   Covid v [decline] GTT [ ]  3) Risk factors:    A: AMA: level II us     B: IUGR:  Serial growth,  testing per M     Discussed IUGR, different etiologies, surveillance and delivery   recommendations.   4) Return: 4 weeks   Glucose next time.     Jacob     "

## 2021-12-15 ENCOUNTER — TELEPHONE (OUTPATIENT)
Dept: MATERNAL FETAL MEDICINE | Facility: CLINIC | Age: 36
End: 2021-12-15
Payer: COMMERCIAL

## 2021-12-15 NOTE — TELEPHONE ENCOUNTER
Nolan phone  assisted in call. LM for Denzel notifying her appt on Thursday 12/16 at [facility] has been cancelled. We do need to reschedule appt at a different location. Pt needs NST/BPP.    Kristina Hernandez RN

## 2021-12-16 ENCOUNTER — TELEPHONE (OUTPATIENT)
Dept: MATERNAL FETAL MEDICINE | Facility: CLINIC | Age: 36
End: 2021-12-16
Payer: COMMERCIAL

## 2021-12-16 NOTE — TELEPHONE ENCOUNTER
Spoke to Denzel regarding cancelled [facility] appt today 12/16. Offered to reschedule patient Thurs/Fri or Monday at a different [facility] location. Pt declined and will come to appt at [facility] on 12/23.    Kristina Hernandez RN

## 2021-12-23 ENCOUNTER — HOSPITAL ENCOUNTER (OUTPATIENT)
Dept: ULTRASOUND IMAGING | Facility: CLINIC | Age: 36
End: 2021-12-23
Attending: OBSTETRICS & GYNECOLOGY
Payer: COMMERCIAL

## 2021-12-23 ENCOUNTER — OFFICE VISIT (OUTPATIENT)
Dept: MATERNAL FETAL MEDICINE | Facility: CLINIC | Age: 36
End: 2021-12-23
Attending: OBSTETRICS & GYNECOLOGY
Payer: COMMERCIAL

## 2021-12-23 DIAGNOSIS — O36.5990 PREGNANCY AFFECTED BY FETAL GROWTH RESTRICTION: ICD-10-CM

## 2021-12-23 PROCEDURE — 76820 UMBILICAL ARTERY ECHO: CPT

## 2021-12-23 PROCEDURE — 76820 UMBILICAL ARTERY ECHO: CPT | Mod: 26 | Performed by: OBSTETRICS & GYNECOLOGY

## 2021-12-23 PROCEDURE — 59025 FETAL NON-STRESS TEST: CPT

## 2021-12-23 PROCEDURE — 59025 FETAL NON-STRESS TEST: CPT | Mod: 26 | Performed by: OBSTETRICS & GYNECOLOGY

## 2021-12-23 PROCEDURE — 76815 OB US LIMITED FETUS(S): CPT | Mod: 26 | Performed by: OBSTETRICS & GYNECOLOGY

## 2021-12-23 NOTE — NURSING NOTE
NST Performed due to FGR.   reviewed efm tracing. See NST/BPP Doc Flowsheet tab.    Ipad , Travis, used to facilitate ultrasound.

## 2021-12-24 NOTE — PROGRESS NOTES
"Please see \"Imaging\" tab under \"Chart Review\" for details of today's ultrasound.    Sandeep Baugh M.D.  Specialist in Maternal-Fetal Medicine     "

## 2022-01-06 ENCOUNTER — TELEPHONE (OUTPATIENT)
Dept: OBGYN | Facility: CLINIC | Age: 37
End: 2022-01-06
Payer: COMMERCIAL

## 2022-01-06 NOTE — TELEPHONE ENCOUNTER
Pt and  calling.  Wanting a letter with lifting restrictions for pt's work for the remainder of her pregnancy.  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    I started a letter, just needs specific weight she is not to lift over.    They can pick letter up in BV when done.    Rachell Will RN

## 2022-01-06 NOTE — LETTER
January 6, 2022      Denzel Tripp  [address] [id number]        To Whom It May Concern,     Denzel Tripp is pregnant and under my care.  She is unable to lift anything over 15 pounds through at least 4/1/2022.      Sincerely,           Jessika James, DO

## 2022-01-06 NOTE — TELEPHONE ENCOUNTER
Letter written     Dr. Jessika James, DO    Obstetrics and Gynecology  [facility] [place] [facility] [place]

## 2022-01-07 ENCOUNTER — OFFICE VISIT (OUTPATIENT)
Dept: MATERNAL FETAL MEDICINE | Facility: CLINIC | Age: 37
End: 2022-01-07
Attending: OBSTETRICS & GYNECOLOGY
Payer: COMMERCIAL

## 2022-01-07 ENCOUNTER — HOSPITAL ENCOUNTER (OUTPATIENT)
Dept: ULTRASOUND IMAGING | Facility: CLINIC | Age: 37
End: 2022-01-07
Attending: OBSTETRICS & GYNECOLOGY
Payer: COMMERCIAL

## 2022-01-07 DIAGNOSIS — O36.5990 PREGNANCY AFFECTED BY FETAL GROWTH RESTRICTION: ICD-10-CM

## 2022-01-07 PROCEDURE — 76816 OB US FOLLOW-UP PER FETUS: CPT

## 2022-01-07 PROCEDURE — 76816 OB US FOLLOW-UP PER FETUS: CPT | Mod: 26 | Performed by: OBSTETRICS & GYNECOLOGY

## 2022-01-07 PROCEDURE — 59025 FETAL NON-STRESS TEST: CPT

## 2022-01-07 PROCEDURE — 76820 UMBILICAL ARTERY ECHO: CPT | Mod: 26 | Performed by: OBSTETRICS & GYNECOLOGY

## 2022-01-07 PROCEDURE — 59025 FETAL NON-STRESS TEST: CPT | Mod: 26 | Performed by: OBSTETRICS & GYNECOLOGY

## 2022-01-13 ENCOUNTER — PRENATAL OFFICE VISIT (OUTPATIENT)
Dept: OBGYN | Facility: CLINIC | Age: 37
End: 2022-01-13
Payer: COMMERCIAL

## 2022-01-13 VITALS
HEIGHT: 68 IN | DIASTOLIC BLOOD PRESSURE: 60 MMHG | BODY MASS INDEX: 28.58 KG/M2 | SYSTOLIC BLOOD PRESSURE: 108 MMHG | WEIGHT: 188.6 LBS

## 2022-01-13 DIAGNOSIS — O09.523 MULTIGRAVIDA OF ADVANCED MATERNAL AGE IN THIRD TRIMESTER: Primary | ICD-10-CM

## 2022-01-13 DIAGNOSIS — O36.5930 POOR FETAL GROWTH AFFECTING MANAGEMENT OF MOTHER IN THIRD TRIMESTER, SINGLE OR UNSPECIFIED FETUS: ICD-10-CM

## 2022-01-13 LAB
ERYTHROCYTE [DISTWIDTH] IN BLOOD BY AUTOMATED COUNT: 14 % (ref 10–15)
HCT VFR BLD AUTO: 32.2 % (ref 35–47)
HGB BLD-MCNC: 10.6 G/DL (ref 11.7–15.7)
MCH RBC QN AUTO: 31.9 PG (ref 26.5–33)
MCHC RBC AUTO-ENTMCNC: 32.9 G/DL (ref 31.5–36.5)
MCV RBC AUTO: 97 FL (ref 78–100)
PLATELET # BLD AUTO: 288 10E3/UL (ref 150–450)
RBC # BLD AUTO: 3.32 10E6/UL (ref 3.8–5.2)
WBC # BLD AUTO: 5.9 10E3/UL (ref 4–11)

## 2022-01-13 PROCEDURE — 82950 GLUCOSE TEST: CPT | Performed by: FAMILY MEDICINE

## 2022-01-13 PROCEDURE — 36415 COLL VENOUS BLD VENIPUNCTURE: CPT | Performed by: FAMILY MEDICINE

## 2022-01-13 PROCEDURE — 85027 COMPLETE CBC AUTOMATED: CPT | Performed by: FAMILY MEDICINE

## 2022-01-13 PROCEDURE — 99207 PR PRENATAL VISIT: CPT | Performed by: FAMILY MEDICINE

## 2022-01-13 PROCEDURE — 86780 TREPONEMA PALLIDUM: CPT | Performed by: FAMILY MEDICINE

## 2022-01-13 ASSESSMENT — MIFFLIN-ST. JEOR: SCORE: 1581.04

## 2022-01-13 NOTE — NURSING NOTE
"28w6d    Chief Complaint   Patient presents with     Prenatal Care     glucose today       Initial /60   Ht 1.715 m (5' 7.5\")   Wt 85.5 kg (188 lb 9.6 oz)   LMP 2021   BMI 29.10 kg/m   Estimated body mass index is 29.1 kg/m  as calculated from the following:    Height as of this encounter: 1.715 m (5' 7.5\").    Weight as of this encounter: 85.5 kg (188 lb 9.6 oz).  BP completed using cuff size: regular    Questioned patient about current smoking habits.  Pt. has never smoked.          The following HM Due: NONE           "

## 2022-01-13 NOTE — PATIENT INSTRUCTIONS
"Return 2 weeks   Return to clinic:  every 4 weeks till 28 weeks, then every 2 weeks till 36 weeks, then weekly till delivery      Phone numbers [place]:  Day/ night [phone] ask for ob triage  Emergency:  Call labor and delivery:  [phone]    What should I call about??    Contraction every 5 minutes for 1 hour 1 minute long (511), bleeding, loss of fluid, headache that doesn't resolve with tylenol, and decreased fetal movement     Start kick counts @ 26-28 weeks   There is an melani for this!  It is called \"count the kicks\"  Keep track of movement and discover your normal baby movement pattern   guideline is listed below  Please call if you do not feel the baby move!  We will have you come in for fetal heart rate monitoring:   Perception of at least 10 FMs during 12 hours of normal maternal activity   Perception of least 10 FMs over two hours when the mother is at rest and focused on [facility] Address   [address], [place], [place] [address] [phone]    Dr. Jessika James, DO    OB/GYN   [facility]                                                      "

## 2022-01-13 NOTE — PROGRESS NOTES
"CC: Here for routine prenatal visit   36 year old y/o  @ 28w6d with Estimated Date of Delivery: 2022     /60   Ht 1.715 m (5' 7.5\")   Wt 85.5 kg (188 lb 9.6 oz)   LMP 2021   BMI 29.10 kg/m    See OB flowsheet  + fetal movement, no contractions, no bleeding, no loss of fluid   Discussed monitoring fetal movement     1) concerns: doing well.    Got covid vaccination, will plan tdap and flu next ob visit   Covid-19 concerns: covid infection and vaccination recommendations discussed.   2) Routine: Blood type A+ RI tdap [ ] flu [ ] GS [declined] level II us [suboptimal views, repeat us with IUGR]   Covid v [s/p 1 dose, next dose next week] GTT [today]  3) Risk factors:               A: AMA: level II us                B: IUGR:  Serial growth,  testing per MFM weekly bpp,                           Discussed IUGR, different etiologies, surveillance and delivery                     recommendations.   4) Return: 2 weeks     [place]illavtars     "

## 2022-01-14 LAB
GLUCOSE 1H P 50 G GLC PO SERPL-MCNC: 113 MG/DL (ref 70–129)
T PALLIDUM AB SER QL: NONREACTIVE

## 2022-01-20 ENCOUNTER — HOSPITAL ENCOUNTER (OUTPATIENT)
Dept: ULTRASOUND IMAGING | Facility: CLINIC | Age: 37
End: 2022-01-20
Attending: OBSTETRICS & GYNECOLOGY
Payer: COMMERCIAL

## 2022-01-20 ENCOUNTER — OFFICE VISIT (OUTPATIENT)
Dept: MATERNAL FETAL MEDICINE | Facility: CLINIC | Age: 37
End: 2022-01-20
Attending: OBSTETRICS & GYNECOLOGY
Payer: COMMERCIAL

## 2022-01-20 DIAGNOSIS — O36.5990 PREGNANCY AFFECTED BY FETAL GROWTH RESTRICTION: ICD-10-CM

## 2022-01-20 PROCEDURE — 76820 UMBILICAL ARTERY ECHO: CPT | Mod: 26 | Performed by: OBSTETRICS & GYNECOLOGY

## 2022-01-20 PROCEDURE — 76815 OB US LIMITED FETUS(S): CPT | Mod: 26 | Performed by: OBSTETRICS & GYNECOLOGY

## 2022-01-20 PROCEDURE — 76815 OB US LIMITED FETUS(S): CPT

## 2022-01-20 PROCEDURE — 59025 FETAL NON-STRESS TEST: CPT

## 2022-01-20 PROCEDURE — 59025 FETAL NON-STRESS TEST: CPT | Mod: 26 | Performed by: OBSTETRICS & GYNECOLOGY

## 2022-01-28 ENCOUNTER — OFFICE VISIT (OUTPATIENT)
Dept: MATERNAL FETAL MEDICINE | Facility: CLINIC | Age: 37
End: 2022-01-28
Attending: OBSTETRICS & GYNECOLOGY
Payer: COMMERCIAL

## 2022-01-28 ENCOUNTER — HOSPITAL ENCOUNTER (OUTPATIENT)
Dept: ULTRASOUND IMAGING | Facility: CLINIC | Age: 37
End: 2022-01-28
Attending: OBSTETRICS & GYNECOLOGY
Payer: COMMERCIAL

## 2022-01-28 DIAGNOSIS — O36.5990 PREGNANCY AFFECTED BY FETAL GROWTH RESTRICTION: ICD-10-CM

## 2022-01-28 DIAGNOSIS — O36.5990 PREGNANCY AFFECTED BY FETAL GROWTH RESTRICTION: Primary | ICD-10-CM

## 2022-01-28 PROCEDURE — 76816 OB US FOLLOW-UP PER FETUS: CPT

## 2022-01-28 PROCEDURE — 76820 UMBILICAL ARTERY ECHO: CPT | Mod: 26 | Performed by: OBSTETRICS & GYNECOLOGY

## 2022-01-28 PROCEDURE — 59025 FETAL NON-STRESS TEST: CPT

## 2022-01-28 PROCEDURE — 59025 FETAL NON-STRESS TEST: CPT | Mod: 26 | Performed by: OBSTETRICS & GYNECOLOGY

## 2022-01-28 PROCEDURE — 76816 OB US FOLLOW-UP PER FETUS: CPT | Mod: 26 | Performed by: OBSTETRICS & GYNECOLOGY

## 2022-01-28 NOTE — PROGRESS NOTES
Please refer to ultrasound report under 'Imaging' Studies of 'Chart Review' tabs.    Damien Phillips M.D.

## 2022-02-09 ENCOUNTER — PRENATAL OFFICE VISIT (OUTPATIENT)
Dept: OBGYN | Facility: CLINIC | Age: 37
End: 2022-02-09
Payer: COMMERCIAL

## 2022-02-09 VITALS
HEIGHT: 68 IN | WEIGHT: 196.5 LBS | DIASTOLIC BLOOD PRESSURE: 60 MMHG | BODY MASS INDEX: 29.78 KG/M2 | SYSTOLIC BLOOD PRESSURE: 116 MMHG

## 2022-02-09 DIAGNOSIS — O09.523 MULTIGRAVIDA OF ADVANCED MATERNAL AGE IN THIRD TRIMESTER: ICD-10-CM

## 2022-02-09 DIAGNOSIS — O36.5930 POOR FETAL GROWTH AFFECTING MANAGEMENT OF MOTHER IN THIRD TRIMESTER, SINGLE OR UNSPECIFIED FETUS: Primary | ICD-10-CM

## 2022-02-09 PROCEDURE — 99207 PR PRENATAL VISIT: CPT | Performed by: FAMILY MEDICINE

## 2022-02-09 ASSESSMENT — MIFFLIN-ST. JEOR: SCORE: 1616.88

## 2022-02-09 NOTE — PATIENT INSTRUCTIONS
"Return 2 weeks     Beto Lafleur Brown     Return to clinic:  every 4 weeks till 28 weeks, then every 2 weeks till 36 weeks, then weekly till delivery      Phone numbers [place]:  Day/ night [phone] ask for ob triage  Emergency:  Call labor and delivery:  [phone]    What should I call about??    Contraction every 5 minutes for 1 hour 1 minute long (511), bleeding, loss of fluid, headache that doesn't resolve with tylenol, and decreased fetal movement     Start kick counts @ 26-28 weeks   There is an melani for this!  It is called \"count the kicks\"  Keep track of movement and discover your normal baby movement pattern   guideline is listed below  Please call if you do not feel the baby move!  We will have you come in for fetal heart rate monitoring:   Perception of at least 10 FMs during 12 hours of normal maternal activity   Perception of least 10 FMs over two hours when the mother is at rest and focused on [facility] Address   [address], [place], [place] [address] [phone]    Dr. Jessika James, DO    OB/GYN   [facility]                                                      "

## 2022-02-09 NOTE — PROGRESS NOTES
"CC: Here for routine prenatal visit   36 year old y/o  @ 32w5d with Estimated Date of Delivery: 2022     /60   Ht 1.715 m (5' 7.5\")   Wt 89.1 kg (196 lb 8 oz)   LMP 2021   BMI 30.32 kg/m       See OB flowsheet  + fetal movement, no contractions, no bleeding, no loss of fluid   Discussed monitoring fetal movement     1) concerns: none   Covid-19 concerns: covid infection and vaccination recommendations discussed.   2) Routine: Blood type A+ RI tdap [next time] flu [plans next visit] GS [declined] level II us [suboptimal views, repeat us with IUGR]   Covid v [s/p 2 doses recently, booster recommended in 6 months] GTT [x]  3) Risk factors:               A: AMA: level II us                B: IUGR:  Serial growth,  testing per MFM weekly bpp,               C: PPBC:  condoms              4) Return: 2 weeks     [place]     "

## 2022-02-17 PROBLEM — K21.9 GASTROESOPHAGEAL REFLUX DISEASE: Status: ACTIVE | Noted: 2017-02-24

## 2022-02-25 ENCOUNTER — OFFICE VISIT (OUTPATIENT)
Dept: MATERNAL FETAL MEDICINE | Facility: CLINIC | Age: 37
End: 2022-02-25
Attending: OBSTETRICS & GYNECOLOGY
Payer: COMMERCIAL

## 2022-02-25 ENCOUNTER — HOSPITAL ENCOUNTER (OUTPATIENT)
Dept: ULTRASOUND IMAGING | Facility: CLINIC | Age: 37
End: 2022-02-25
Attending: OBSTETRICS & GYNECOLOGY
Payer: COMMERCIAL

## 2022-02-25 ENCOUNTER — PRENATAL OFFICE VISIT (OUTPATIENT)
Dept: OBGYN | Facility: CLINIC | Age: 37
End: 2022-02-25
Payer: COMMERCIAL

## 2022-02-25 VITALS — DIASTOLIC BLOOD PRESSURE: 80 MMHG | BODY MASS INDEX: 30.55 KG/M2 | SYSTOLIC BLOOD PRESSURE: 122 MMHG | WEIGHT: 198 LBS

## 2022-02-25 DIAGNOSIS — O36.5990 PREGNANCY AFFECTED BY FETAL GROWTH RESTRICTION: ICD-10-CM

## 2022-02-25 DIAGNOSIS — O09.523 MULTIGRAVIDA OF ADVANCED MATERNAL AGE IN THIRD TRIMESTER: Primary | ICD-10-CM

## 2022-02-25 DIAGNOSIS — Z23 NEED FOR PROPHYLACTIC VACCINATION AND INOCULATION AGAINST INFLUENZA: ICD-10-CM

## 2022-02-25 DIAGNOSIS — Z23 NEED FOR TDAP VACCINATION: ICD-10-CM

## 2022-02-25 DIAGNOSIS — O36.5930 POOR FETAL GROWTH AFFECTING MANAGEMENT OF MOTHER IN THIRD TRIMESTER, SINGLE OR UNSPECIFIED FETUS: Primary | ICD-10-CM

## 2022-02-25 PROCEDURE — 90686 IIV4 VACC NO PRSV 0.5 ML IM: CPT | Performed by: OBSTETRICS & GYNECOLOGY

## 2022-02-25 PROCEDURE — 90472 IMMUNIZATION ADMIN EACH ADD: CPT | Performed by: OBSTETRICS & GYNECOLOGY

## 2022-02-25 PROCEDURE — 90471 IMMUNIZATION ADMIN: CPT | Performed by: OBSTETRICS & GYNECOLOGY

## 2022-02-25 PROCEDURE — 76816 OB US FOLLOW-UP PER FETUS: CPT | Mod: 26 | Performed by: OBSTETRICS & GYNECOLOGY

## 2022-02-25 PROCEDURE — 76816 OB US FOLLOW-UP PER FETUS: CPT

## 2022-02-25 PROCEDURE — 90715 TDAP VACCINE 7 YRS/> IM: CPT | Performed by: OBSTETRICS & GYNECOLOGY

## 2022-02-25 PROCEDURE — 99207 PR PRENATAL VISIT: CPT | Performed by: OBSTETRICS & GYNECOLOGY

## 2022-02-25 RX ORDER — FERROUS SULFATE 325(65) MG
325 TABLET ORAL
COMMUNITY
End: 2022-03-29

## 2022-02-25 NOTE — NURSING NOTE
"Chief Complaint   Patient presents with     Prenatal Care       Initial /80   Wt 89.8 kg (198 lb)   LMP 2021   Breastfeeding No   BMI 30.55 kg/m   Estimated body mass index is 30.55 kg/m  as calculated from the following:    Height as of 22: 1.715 m (5' 7.5\").    Weight as of this encounter: 89.8 kg (198 lb).  BP completed using cuff size: regular    Questioned patient about current smoking habits.  Pt. has never smoked.          35w0d  + FM daily  - cramping or bleeding  No concerns  Christi Blanco LPN               "

## 2022-02-25 NOTE — PATIENT INSTRUCTIONS
Phone numbers [place]:  Day/ night [phone] ask for OB triage  Emergency:  Call labor and delivery:  [phone]     Call right away with any of the following concerns:    1.   Regular, painful contractions every 5 minutes that make it difficult to walk or talk for over approximately one hour's time.    2.   Vaginal bleeding more than just a spot or 2 on the toilet paper.    3.   Leaking fluid of any amount, or suspicion of ruptured membranes    4.   Decreased movement of your baby.      Continue your prenatal vitamins daily.    Avoid lying flat on your back for a prolonged period.    Consume 2-3 servings of fish or seafood weekly for fetal brain development. Avoid shark, tilefish, swordfish, and albacore tune as these contain very re levels of mercury. No raw seafood in pregnancy.    Be sure you are consuming 1000mg of calcium and 1000 IU of vitamin D daily.    Unless instructed otherwise, try to fit in 30 minutes of moderate exercise daily.    Please call with any additional concerns that your have, and continue your prenatal visits weekly.    You may complete your hospital pre-registration online at [email or web address].       [facility] Birthplace Virtual Tour link:    [email or web address]      [facility] Prenatal, Pregnancy, and Postpartum online education  [email or web address]    [email or web address]

## 2022-02-25 NOTE — PROGRESS NOTES
CC: Here for routine prenatal visit   36 year old y/o  @ 35w0d with Estimated Date of Delivery: 2022     /80   Wt 89.8 kg (198 lb)   LMP 2021   Breastfeeding No   BMI 30.55 kg/m       See OB flowsheet  + fetal movement, no contractions, no bleeding, no loss of fluid     1) concerns: none   Covid-19 concerns: covid infection and vaccination recommendations discussed.   2) Routine: Blood type A+ RI tdap [next time] flu [plans next visit] GS [declined] level II us [suboptimal views, repeat us with IUGR]   Covid v [s/p 2 doses recently, booster recommended in 6 months] GTT [x]  3) Risk factors:               A: AMA: level II us                B: IUGR:  19th %ile by MFM US 2022. No further imaging recommended.               C: PPBC:  condoms              4) Return: weekly through delivery. GBS next week    Gill Mendez MD

## 2022-03-10 ENCOUNTER — PRENATAL OFFICE VISIT (OUTPATIENT)
Dept: OBGYN | Facility: CLINIC | Age: 37
End: 2022-03-10
Payer: COMMERCIAL

## 2022-03-10 VITALS
WEIGHT: 202 LBS | DIASTOLIC BLOOD PRESSURE: 60 MMHG | SYSTOLIC BLOOD PRESSURE: 118 MMHG | BODY MASS INDEX: 30.62 KG/M2 | HEIGHT: 68 IN

## 2022-03-10 DIAGNOSIS — O09.522 MULTIGRAVIDA OF ADVANCED MATERNAL AGE IN SECOND TRIMESTER: ICD-10-CM

## 2022-03-10 DIAGNOSIS — Z36.85 SCREENING, ANTENATAL, FOR STREPTOCOCCUS B: Primary | ICD-10-CM

## 2022-03-10 PROCEDURE — 87653 STREP B DNA AMP PROBE: CPT | Performed by: OBSTETRICS & GYNECOLOGY

## 2022-03-10 PROCEDURE — 99207 PR PRENATAL VISIT: CPT | Performed by: OBSTETRICS & GYNECOLOGY

## 2022-03-10 RX ORDER — PRENATAL VIT/IRON FUM/FOLIC AC 27MG-0.8MG
1 TABLET ORAL DAILY
Qty: 90 TABLET | Refills: 3 | Status: SHIPPED | OUTPATIENT
Start: 2022-03-10 | End: 2024-04-20

## 2022-03-10 NOTE — NURSING NOTE
"Chief Complaint   Patient presents with     Prenatal Care     36 6/7 weeks       Initial /60 (BP Location: Left arm, Patient Position: Chair, Cuff Size: Adult Regular)   Ht 1.715 m (5' 7.5\")   Wt 91.6 kg (202 lb)   LMP 2021   Breastfeeding No   BMI 31.17 kg/m   Estimated body mass index is 31.17 kg/m  as calculated from the following:    Height as of this encounter: 1.715 m (5' 7.5\").    Weight as of this encounter: 91.6 kg (202 lb).  BP completed using cuff size: regular    Questioned patient about current smoking habits.  Pt. has never smoked.          The following HM Due: NONE  +fetal movement  -swelling    Mell Pryor, CMA      "

## 2022-03-10 NOTE — PROGRESS NOTES
IUP at 36w6d,      AMA: level II within normal limits  IUGR: resolved, no further growth US indicated  GBS collected today  Declined cervical exam.    Return to clinic weekly through delivery.   Gill Mendez MD

## 2022-03-11 LAB — GP B STREP DNA SPEC QL NAA+PROBE: POSITIVE

## 2022-03-13 PROBLEM — O99.820 GBS (GROUP B STREPTOCOCCUS CARRIER), +RV CULTURE, CURRENTLY PREGNANT: Status: ACTIVE | Noted: 2022-03-13

## 2022-03-17 ENCOUNTER — PRENATAL OFFICE VISIT (OUTPATIENT)
Dept: OBGYN | Facility: CLINIC | Age: 37
End: 2022-03-17
Payer: COMMERCIAL

## 2022-03-17 VITALS — BODY MASS INDEX: 30.86 KG/M2 | SYSTOLIC BLOOD PRESSURE: 100 MMHG | WEIGHT: 200 LBS | DIASTOLIC BLOOD PRESSURE: 62 MMHG

## 2022-03-17 DIAGNOSIS — O09.529 SUPERVISION OF HIGH-RISK PREGNANCY OF ELDERLY MULTIGRAVIDA: Primary | ICD-10-CM

## 2022-03-17 PROCEDURE — 99207 PR PRENATAL VISIT: CPT | Performed by: OBSTETRICS & GYNECOLOGY

## 2022-03-17 NOTE — NURSING NOTE
"Chief Complaint   Patient presents with     Prenatal Care   37w6d  No concerns    initial /62   Wt 90.7 kg (200 lb)   LMP 07/09/2021   BMI 30.86 kg/m   Estimated body mass index is 30.86 kg/m  as calculated from the following:    Height as of 3/10/22: 1.715 m (5' 7.5\").    Weight as of this encounter: 90.7 kg (200 lb).  BP completed using cuff size large.  Jeny Acosta CMA    "

## 2022-03-17 NOTE — PROGRESS NOTES
IUP at 37w6d,      AMA: level II within normal limits  IUGR: resolved, no further growth US indicated  GBS+, discussed PCN in labor.  Declined cervical exam.    Return to clinic weekly through delivery.   Gill Mendez MD

## 2022-03-25 ENCOUNTER — PRENATAL OFFICE VISIT (OUTPATIENT)
Dept: OBGYN | Facility: CLINIC | Age: 37
End: 2022-03-25
Payer: COMMERCIAL

## 2022-03-25 VITALS — WEIGHT: 203.9 LBS | SYSTOLIC BLOOD PRESSURE: 118 MMHG | BODY MASS INDEX: 31.46 KG/M2 | DIASTOLIC BLOOD PRESSURE: 70 MMHG

## 2022-03-25 DIAGNOSIS — Z34.80 SUPERVISION OF OTHER NORMAL PREGNANCY, ANTEPARTUM: Primary | ICD-10-CM

## 2022-03-25 DIAGNOSIS — O09.522 MULTIGRAVIDA OF ADVANCED MATERNAL AGE IN SECOND TRIMESTER: ICD-10-CM

## 2022-03-25 PROCEDURE — 99207 PR PRENATAL VISIT: CPT | Performed by: OBSTETRICS & GYNECOLOGY

## 2022-03-25 NOTE — PROGRESS NOTES
36 year old  at 39w0d     AMA: level II within normal limits  IUGR: resolved, no further growth US indicated  GBS+,  Plan PCN in labor.    RTC weekly until delivery     Malgorzata Pérez MD, MPH  [facility]

## 2022-03-25 NOTE — NURSING NOTE
"Chief Complaint   Patient presents with     Prenatal Care     39w        Initial /70 (BP Location: Right arm, Cuff Size: Adult Regular)   Wt 92.5 kg (203 lb 14.4 oz)   LMP 2021   BMI 31.46 kg/m   Estimated body mass index is 31.46 kg/m  as calculated from the following:    Height as of 3/10/22: 1.715 m (5' 7.5\").    Weight as of this encounter: 92.5 kg (203 lb 14.4 oz).  BP completed using cuff size: regular    Questioned patient about current smoking habits.  Pt. has never smoked.          The following HM Due: NONE    "

## 2022-03-29 ENCOUNTER — PRENATAL OFFICE VISIT (OUTPATIENT)
Dept: OBGYN | Facility: CLINIC | Age: 37
End: 2022-03-29
Payer: COMMERCIAL

## 2022-03-29 VITALS — SYSTOLIC BLOOD PRESSURE: 120 MMHG | WEIGHT: 204.4 LBS | BODY MASS INDEX: 31.54 KG/M2 | DIASTOLIC BLOOD PRESSURE: 70 MMHG

## 2022-03-29 DIAGNOSIS — Z34.80 SUPERVISION OF OTHER NORMAL PREGNANCY, ANTEPARTUM: Primary | ICD-10-CM

## 2022-03-29 PROCEDURE — 99207 PR PRENATAL VISIT: CPT | Performed by: OBSTETRICS & GYNECOLOGY

## 2022-03-29 NOTE — LETTER
[facility] [place] [address]  [address]  [facility] [address]  Phone: [phone]  Fax: [phone]    03/29/22    Denzel Tripp  [address] [address]      To whom it may concern:       Denzel Tripp is a patient under my care. She is currently pregnant with an estimated due on April 1st, 2022.     Sincerely,      Gill Mendez MD

## 2022-03-29 NOTE — NURSING NOTE
"Chief Complaint   Patient presents with     Prenatal Care     39 weeks 4 days, patient declines cervix check today. No c/o VB, LoF, cramping/contractions. Feeling FM daily.        Initial /70   Wt 92.7 kg (204 lb 6.4 oz)   LMP 2021   BMI 31.54 kg/m   Estimated body mass index is 31.54 kg/m  as calculated from the following:    Height as of 3/10/22: 1.715 m (5' 7.5\").    Weight as of this encounter: 92.7 kg (204 lb 6.4 oz).  BP completed using cuff size: regular    Questioned patient about current smoking habits.  Pt. has never smoked.          The following HM Due: NONE    Marty Ivy CMA             "

## 2022-04-07 ENCOUNTER — PRENATAL OFFICE VISIT (OUTPATIENT)
Dept: OBGYN | Facility: CLINIC | Age: 37
End: 2022-04-07
Payer: COMMERCIAL

## 2022-04-07 VITALS
WEIGHT: 205 LBS | SYSTOLIC BLOOD PRESSURE: 126 MMHG | HEIGHT: 68 IN | BODY MASS INDEX: 31.07 KG/M2 | DIASTOLIC BLOOD PRESSURE: 70 MMHG

## 2022-04-07 DIAGNOSIS — Z34.90 ENCOUNTER FOR INDUCTION OF LABOR: Primary | ICD-10-CM

## 2022-04-07 DIAGNOSIS — O09.529 SUPERVISION OF HIGH-RISK PREGNANCY OF ELDERLY MULTIGRAVIDA: ICD-10-CM

## 2022-04-07 LAB — SARS-COV-2 RNA RESP QL NAA+PROBE: NEGATIVE

## 2022-04-07 PROCEDURE — 99207 PR PRENATAL VISIT: CPT | Performed by: OBSTETRICS & GYNECOLOGY

## 2022-04-07 PROCEDURE — U0003 INFECTIOUS AGENT DETECTION BY NUCLEIC ACID (DNA OR RNA); SEVERE ACUTE RESPIRATORY SYNDROME CORONAVIRUS 2 (SARS-COV-2) (CORONAVIRUS DISEASE [COVID-19]), AMPLIFIED PROBE TECHNIQUE, MAKING USE OF HIGH THROUGHPUT TECHNOLOGIES AS DESCRIBED BY CMS-2020-01-R: HCPCS | Performed by: OBSTETRICS & GYNECOLOGY

## 2022-04-07 PROCEDURE — U0005 INFEC AGEN DETEC AMPLI PROBE: HCPCS | Performed by: OBSTETRICS & GYNECOLOGY

## 2022-04-07 NOTE — PROGRESS NOTES
37 year old  at 40w6d     AMA: level II within normal limits  IUGR: resolved, no further growth US indicated  GBS+,  Plan PCN in labor.  Lateterm pregnancy: NST performed in clinic. IOL discussed, unfavorable, plan to initiate prostaglandin cervical ripening tomorrow night on L&D.  NST:  , mod marcy, + accels, no decels   [place]: no contractions    CAt I, reactive.       Gill Mendez MD   [facility]

## 2022-04-07 NOTE — PROGRESS NOTES
36 year old  at 40w6d     AMA: level II within normal limits  IUGR: resolved, no further growth US indicated  Late term pregnancy: discussed IOL at length today.   GBS+,  Plan PCN in labor.      Gill Mendez MD   [facility]

## 2022-04-07 NOTE — NURSING NOTE
"Chief Complaint   Patient presents with     Prenatal Care     40 6/7 weeks       Initial /70 (BP Location: Left arm, Patient Position: Chair, Cuff Size: Adult Regular)   Ht 1.715 m (5' 7.5\")   Wt 93 kg (205 lb)   LMP 2021   Breastfeeding No   BMI 31.63 kg/m   Estimated body mass index is 31.63 kg/m  as calculated from the following:    Height as of this encounter: 1.715 m (5' 7.5\").    Weight as of this encounter: 93 kg (205 lb).  BP completed using cuff size: regular    Questioned patient about current smoking habits.  Pt. has never smoked.          The following HM Due: NONE  +fetal movement  -swelling  -headaches  +ricci sandra Pryor, CMA    "

## 2022-04-08 ENCOUNTER — HOSPITAL ENCOUNTER (INPATIENT)
Facility: CLINIC | Age: 37
LOS: 2 days | Discharge: HOME | End: 2022-04-10
Attending: OBSTETRICS & GYNECOLOGY | Admitting: OBSTETRICS & GYNECOLOGY
Payer: COMMERCIAL

## 2022-04-08 LAB
ABO/RH(D): NORMAL
ANTIBODY SCREEN: NEGATIVE
SPECIMEN EXPIRATION DATE: NORMAL

## 2022-04-08 PROCEDURE — 86780 TREPONEMA PALLIDUM: CPT | Performed by: OBSTETRICS & GYNECOLOGY

## 2022-04-08 PROCEDURE — 86901 BLOOD TYPING SEROLOGIC RH(D): CPT | Performed by: OBSTETRICS & GYNECOLOGY

## 2022-04-08 PROCEDURE — 120N000001 HC R&B MED SURG/OB

## 2022-04-08 PROCEDURE — 250N000013 HC RX MED GY IP 250 OP 250 PS 637: Performed by: OBSTETRICS & GYNECOLOGY

## 2022-04-08 PROCEDURE — 86850 RBC ANTIBODY SCREEN: CPT | Performed by: OBSTETRICS & GYNECOLOGY

## 2022-04-08 PROCEDURE — 258N000003 HC RX IP 258 OP 636: Performed by: OBSTETRICS & GYNECOLOGY

## 2022-04-08 PROCEDURE — 3E0P7VZ INTRODUCTION OF HORMONE INTO FEMALE REPRODUCTIVE, VIA NATURAL OR ARTIFICIAL OPENING: ICD-10-PCS | Performed by: OBSTETRICS & GYNECOLOGY

## 2022-04-08 PROCEDURE — 36415 COLL VENOUS BLD VENIPUNCTURE: CPT | Performed by: OBSTETRICS & GYNECOLOGY

## 2022-04-08 RX ORDER — PENICILLIN G 3000000 [IU]/50ML
3 INJECTION, SOLUTION INTRAVENOUS EVERY 4 HOURS
Status: DISCONTINUED | OUTPATIENT
Start: 2022-04-09 | End: 2022-04-09 | Stop reason: HOSPADM

## 2022-04-08 RX ORDER — CITRIC ACID/SODIUM CITRATE 334-500MG
30 SOLUTION, ORAL ORAL
Status: DISCONTINUED | OUTPATIENT
Start: 2022-04-08 | End: 2022-04-09 | Stop reason: HOSPADM

## 2022-04-08 RX ORDER — CARBOPROST TROMETHAMINE 250 UG/ML
250 INJECTION, SOLUTION INTRAMUSCULAR
Status: DISCONTINUED | OUTPATIENT
Start: 2022-04-08 | End: 2022-04-09 | Stop reason: HOSPADM

## 2022-04-08 RX ORDER — MISOPROSTOL 200 UG/1
800 TABLET ORAL
Status: DISCONTINUED | OUTPATIENT
Start: 2022-04-08 | End: 2022-04-09 | Stop reason: HOSPADM

## 2022-04-08 RX ORDER — PROCHLORPERAZINE MALEATE 10 MG
10 TABLET ORAL EVERY 6 HOURS PRN
Status: DISCONTINUED | OUTPATIENT
Start: 2022-04-08 | End: 2022-04-09 | Stop reason: HOSPADM

## 2022-04-08 RX ORDER — METOCLOPRAMIDE 10 MG/1
10 TABLET ORAL EVERY 6 HOURS PRN
Status: DISCONTINUED | OUTPATIENT
Start: 2022-04-08 | End: 2022-04-09 | Stop reason: HOSPADM

## 2022-04-08 RX ORDER — PENICILLIN G POTASSIUM 5000000 [IU]/1
5 INJECTION, POWDER, FOR SOLUTION INTRAMUSCULAR; INTRAVENOUS ONCE
Status: COMPLETED | OUTPATIENT
Start: 2022-04-08 | End: 2022-04-09

## 2022-04-08 RX ORDER — MISOPROSTOL 100 UG/1
25 TABLET ORAL
Status: DISCONTINUED | OUTPATIENT
Start: 2022-04-08 | End: 2022-04-09 | Stop reason: HOSPADM

## 2022-04-08 RX ORDER — PROCHLORPERAZINE 25 MG
25 SUPPOSITORY, RECTAL RECTAL EVERY 12 HOURS PRN
Status: DISCONTINUED | OUTPATIENT
Start: 2022-04-08 | End: 2022-04-09 | Stop reason: HOSPADM

## 2022-04-08 RX ORDER — MISOPROSTOL 200 UG/1
400 TABLET ORAL
Status: DISCONTINUED | OUTPATIENT
Start: 2022-04-08 | End: 2022-04-09 | Stop reason: HOSPADM

## 2022-04-08 RX ORDER — OXYTOCIN/0.9 % SODIUM CHLORIDE 30/500 ML
340 PLASTIC BAG, INJECTION (ML) INTRAVENOUS CONTINUOUS PRN
Status: COMPLETED | OUTPATIENT
Start: 2022-04-08 | End: 2022-04-09

## 2022-04-08 RX ORDER — OXYTOCIN/0.9 % SODIUM CHLORIDE 30/500 ML
100-340 PLASTIC BAG, INJECTION (ML) INTRAVENOUS CONTINUOUS PRN
Status: DISCONTINUED | OUTPATIENT
Start: 2022-04-08 | End: 2022-04-10

## 2022-04-08 RX ORDER — NALOXONE HYDROCHLORIDE 0.4 MG/ML
0.4 INJECTION, SOLUTION INTRAMUSCULAR; INTRAVENOUS; SUBCUTANEOUS
Status: DISCONTINUED | OUTPATIENT
Start: 2022-04-08 | End: 2022-04-09 | Stop reason: HOSPADM

## 2022-04-08 RX ORDER — LIDOCAINE 40 MG/G
CREAM TOPICAL
Status: DISCONTINUED | OUTPATIENT
Start: 2022-04-08 | End: 2022-04-09 | Stop reason: HOSPADM

## 2022-04-08 RX ORDER — HYDROXYZINE HYDROCHLORIDE 50 MG/1
50 TABLET, FILM COATED ORAL
Status: DISCONTINUED | OUTPATIENT
Start: 2022-04-08 | End: 2022-04-09 | Stop reason: HOSPADM

## 2022-04-08 RX ORDER — OXYTOCIN 10 [USP'U]/ML
10 INJECTION, SOLUTION INTRAMUSCULAR; INTRAVENOUS
Status: DISCONTINUED | OUTPATIENT
Start: 2022-04-08 | End: 2022-04-10

## 2022-04-08 RX ORDER — TRANEXAMIC ACID 10 MG/ML
1 INJECTION, SOLUTION INTRAVENOUS EVERY 30 MIN PRN
Status: DISCONTINUED | OUTPATIENT
Start: 2022-04-08 | End: 2022-04-09 | Stop reason: HOSPADM

## 2022-04-08 RX ORDER — NALOXONE HYDROCHLORIDE 0.4 MG/ML
0.2 INJECTION, SOLUTION INTRAMUSCULAR; INTRAVENOUS; SUBCUTANEOUS
Status: DISCONTINUED | OUTPATIENT
Start: 2022-04-08 | End: 2022-04-09 | Stop reason: HOSPADM

## 2022-04-08 RX ORDER — METOCLOPRAMIDE HYDROCHLORIDE 5 MG/ML
10 INJECTION INTRAMUSCULAR; INTRAVENOUS EVERY 6 HOURS PRN
Status: DISCONTINUED | OUTPATIENT
Start: 2022-04-08 | End: 2022-04-09 | Stop reason: HOSPADM

## 2022-04-08 RX ORDER — IBUPROFEN 800 MG/1
800 TABLET, FILM COATED ORAL
Status: DISCONTINUED | OUTPATIENT
Start: 2022-04-08 | End: 2022-04-10

## 2022-04-08 RX ORDER — METHYLERGONOVINE MALEATE 0.2 MG/ML
200 INJECTION INTRAVENOUS
Status: DISCONTINUED | OUTPATIENT
Start: 2022-04-08 | End: 2022-04-09 | Stop reason: HOSPADM

## 2022-04-08 RX ORDER — KETOROLAC TROMETHAMINE 30 MG/ML
30 INJECTION, SOLUTION INTRAMUSCULAR; INTRAVENOUS
Status: DISCONTINUED | OUTPATIENT
Start: 2022-04-08 | End: 2022-04-10

## 2022-04-08 RX ORDER — ONDANSETRON 4 MG/1
4 TABLET, ORALLY DISINTEGRATING ORAL EVERY 6 HOURS PRN
Status: DISCONTINUED | OUTPATIENT
Start: 2022-04-08 | End: 2022-04-09 | Stop reason: HOSPADM

## 2022-04-08 RX ORDER — OXYTOCIN 10 [USP'U]/ML
10 INJECTION, SOLUTION INTRAMUSCULAR; INTRAVENOUS
Status: DISCONTINUED | OUTPATIENT
Start: 2022-04-08 | End: 2022-04-09 | Stop reason: HOSPADM

## 2022-04-08 RX ORDER — ONDANSETRON 2 MG/ML
4 INJECTION INTRAMUSCULAR; INTRAVENOUS EVERY 6 HOURS PRN
Status: DISCONTINUED | OUTPATIENT
Start: 2022-04-08 | End: 2022-04-09 | Stop reason: HOSPADM

## 2022-04-08 RX ADMIN — MISOPROSTOL 25 MCG: 100 TABLET ORAL at 21:29

## 2022-04-08 RX ADMIN — SODIUM CHLORIDE, POTASSIUM CHLORIDE, SODIUM LACTATE AND CALCIUM CHLORIDE 500 ML: 600; 310; 30; 20 INJECTION, SOLUTION INTRAVENOUS at 22:59

## 2022-04-08 ASSESSMENT — ACTIVITIES OF DAILY LIVING (ADL)
WEAR_GLASSES_OR_BLIND: NO
DOING_ERRANDS_INDEPENDENTLY_DIFFICULTY: NO
ADLS_ACUITY_SCORE: 5
ADLS_ACUITY_SCORE: 5
WALKING_OR_CLIMBING_STAIRS_DIFFICULTY: NO
DRESSING/BATHING_DIFFICULTY: NO
TOILETING_ISSUES: NO
FALL_HISTORY_WITHIN_LAST_SIX_MONTHS: NO
DIFFICULTY_EATING/SWALLOWING: NO
CONCENTRATING,_REMEMBERING_OR_MAKING_DECISIONS_DIFFICULTY: NO
CHANGE_IN_FUNCTIONAL_STATUS_SINCE_ONSET_OF_CURRENT_ILLNESS/INJURY: NO
ADLS_ACUITY_SCORE: 5

## 2022-04-09 ENCOUNTER — ANESTHESIA (OUTPATIENT)
Dept: OBGYN | Facility: CLINIC | Age: 37
End: 2022-04-09
Payer: COMMERCIAL

## 2022-04-09 ENCOUNTER — ANESTHESIA EVENT (OUTPATIENT)
Dept: OBGYN | Facility: CLINIC | Age: 37
End: 2022-04-09
Payer: COMMERCIAL

## 2022-04-09 PROBLEM — O09.529: Status: ACTIVE | Noted: 2022-04-09

## 2022-04-09 PROBLEM — O99.820 GBS (GROUP B STREPTOCOCCUS CARRIER), +RV CULTURE, CURRENTLY PREGNANT: Status: RESOLVED | Noted: 2022-03-13 | Resolved: 2022-04-09

## 2022-04-09 LAB — T PALLIDUM AB SER QL: NONREACTIVE

## 2022-04-09 PROCEDURE — 250N000009 HC RX 250: Performed by: OBSTETRICS & GYNECOLOGY

## 2022-04-09 PROCEDURE — 250N000011 HC RX IP 250 OP 636: Performed by: ANESTHESIOLOGY

## 2022-04-09 PROCEDURE — 250N000013 HC RX MED GY IP 250 OP 250 PS 637: Performed by: OBSTETRICS & GYNECOLOGY

## 2022-04-09 PROCEDURE — 370N000003 HC ANESTHESIA WARD SERVICE

## 2022-04-09 PROCEDURE — 258N000003 HC RX IP 258 OP 636

## 2022-04-09 PROCEDURE — 59400 OBSTETRICAL CARE: CPT | Performed by: OBSTETRICS & GYNECOLOGY

## 2022-04-09 PROCEDURE — 0KQM0ZZ REPAIR PERINEUM MUSCLE, OPEN APPROACH: ICD-10-PCS | Performed by: OBSTETRICS & GYNECOLOGY

## 2022-04-09 PROCEDURE — 0UQMXZZ REPAIR VULVA, EXTERNAL APPROACH: ICD-10-PCS | Performed by: OBSTETRICS & GYNECOLOGY

## 2022-04-09 PROCEDURE — 250N000011 HC RX IP 250 OP 636: Performed by: OBSTETRICS & GYNECOLOGY

## 2022-04-09 PROCEDURE — 258N000003 HC RX IP 258 OP 636: Performed by: OBSTETRICS & GYNECOLOGY

## 2022-04-09 PROCEDURE — 250N000011 HC RX IP 250 OP 636

## 2022-04-09 PROCEDURE — 722N000001 HC LABOR CARE VAGINAL DELIVERY SINGLE

## 2022-04-09 PROCEDURE — 120N000001 HC R&B MED SURG/OB

## 2022-04-09 RX ORDER — LIDOCAINE HYDROCHLORIDE AND EPINEPHRINE 15; 5 MG/ML; UG/ML
3 INJECTION, SOLUTION EPIDURAL
Status: DISCONTINUED | OUTPATIENT
Start: 2022-04-09 | End: 2022-04-09 | Stop reason: HOSPADM

## 2022-04-09 RX ORDER — BUPIVACAINE HYDROCHLORIDE 2.5 MG/ML
10 INJECTION, SOLUTION EPIDURAL; INFILTRATION; INTRACAUDAL ONCE
Status: COMPLETED | OUTPATIENT
Start: 2022-04-09 | End: 2022-04-09

## 2022-04-09 RX ORDER — IBUPROFEN 800 MG/1
800 TABLET, FILM COATED ORAL EVERY 6 HOURS PRN
Status: DISCONTINUED | OUTPATIENT
Start: 2022-04-09 | End: 2022-04-10 | Stop reason: HOSPADM

## 2022-04-09 RX ORDER — OXYTOCIN/0.9 % SODIUM CHLORIDE 30/500 ML
340 PLASTIC BAG, INJECTION (ML) INTRAVENOUS CONTINUOUS PRN
Status: DISCONTINUED | OUTPATIENT
Start: 2022-04-09 | End: 2022-04-10 | Stop reason: HOSPADM

## 2022-04-09 RX ORDER — TRANEXAMIC ACID 10 MG/ML
1 INJECTION, SOLUTION INTRAVENOUS EVERY 30 MIN PRN
Status: DISCONTINUED | OUTPATIENT
Start: 2022-04-09 | End: 2022-04-10 | Stop reason: HOSPADM

## 2022-04-09 RX ORDER — OXYTOCIN 10 [USP'U]/ML
10 INJECTION, SOLUTION INTRAMUSCULAR; INTRAVENOUS
Status: DISCONTINUED | OUTPATIENT
Start: 2022-04-09 | End: 2022-04-10 | Stop reason: HOSPADM

## 2022-04-09 RX ORDER — BISACODYL 10 MG
10 SUPPOSITORY, RECTAL RECTAL DAILY PRN
Status: DISCONTINUED | OUTPATIENT
Start: 2022-04-09 | End: 2022-04-10 | Stop reason: HOSPADM

## 2022-04-09 RX ORDER — CARBOPROST TROMETHAMINE 250 UG/ML
250 INJECTION, SOLUTION INTRAMUSCULAR
Status: DISCONTINUED | OUTPATIENT
Start: 2022-04-09 | End: 2022-04-10 | Stop reason: HOSPADM

## 2022-04-09 RX ORDER — MISOPROSTOL 200 UG/1
400 TABLET ORAL
Status: DISCONTINUED | OUTPATIENT
Start: 2022-04-09 | End: 2022-04-10 | Stop reason: HOSPADM

## 2022-04-09 RX ORDER — ONDANSETRON 2 MG/ML
4 INJECTION INTRAMUSCULAR; INTRAVENOUS EVERY 6 HOURS PRN
Status: DISCONTINUED | OUTPATIENT
Start: 2022-04-09 | End: 2022-04-09 | Stop reason: HOSPADM

## 2022-04-09 RX ORDER — FENTANYL/BUPIVACAINE/NS/PF 2-1250MCG
PLASTIC BAG, INJECTION (ML) INJECTION
Status: COMPLETED
Start: 2022-04-09 | End: 2022-04-09

## 2022-04-09 RX ORDER — METHYLERGONOVINE MALEATE 0.2 MG/ML
200 INJECTION INTRAVENOUS
Status: DISCONTINUED | OUTPATIENT
Start: 2022-04-09 | End: 2022-04-10 | Stop reason: HOSPADM

## 2022-04-09 RX ORDER — BUPIVACAINE HYDROCHLORIDE 2.5 MG/ML
INJECTION, SOLUTION EPIDURAL; INFILTRATION; INTRACAUDAL
Status: COMPLETED | OUTPATIENT
Start: 2022-04-09 | End: 2022-04-09

## 2022-04-09 RX ORDER — SODIUM CHLORIDE, SODIUM LACTATE, POTASSIUM CHLORIDE, CALCIUM CHLORIDE 600; 310; 30; 20 MG/100ML; MG/100ML; MG/100ML; MG/100ML
INJECTION, SOLUTION INTRAVENOUS CONTINUOUS
Status: DISCONTINUED | OUTPATIENT
Start: 2022-04-09 | End: 2022-04-10

## 2022-04-09 RX ORDER — OXYTOCIN/0.9 % SODIUM CHLORIDE 30/500 ML
1-24 PLASTIC BAG, INJECTION (ML) INTRAVENOUS CONTINUOUS
Status: DISCONTINUED | OUTPATIENT
Start: 2022-04-09 | End: 2022-04-09 | Stop reason: HOSPADM

## 2022-04-09 RX ORDER — MISOPROSTOL 200 UG/1
800 TABLET ORAL
Status: DISCONTINUED | OUTPATIENT
Start: 2022-04-09 | End: 2022-04-10 | Stop reason: HOSPADM

## 2022-04-09 RX ORDER — HYDROCORTISONE 2.5 %
CREAM (GRAM) TOPICAL 3 TIMES DAILY PRN
Status: DISCONTINUED | OUTPATIENT
Start: 2022-04-09 | End: 2022-04-10 | Stop reason: HOSPADM

## 2022-04-09 RX ORDER — NALBUPHINE HYDROCHLORIDE 10 MG/ML
2.5-5 INJECTION, SOLUTION INTRAMUSCULAR; INTRAVENOUS; SUBCUTANEOUS EVERY 6 HOURS PRN
Status: DISCONTINUED | OUTPATIENT
Start: 2022-04-09 | End: 2022-04-10

## 2022-04-09 RX ORDER — SODIUM CHLORIDE, SODIUM LACTATE, POTASSIUM CHLORIDE, CALCIUM CHLORIDE 600; 310; 30; 20 MG/100ML; MG/100ML; MG/100ML; MG/100ML
INJECTION, SOLUTION INTRAVENOUS CONTINUOUS PRN
Status: DISCONTINUED | OUTPATIENT
Start: 2022-04-09 | End: 2022-04-09 | Stop reason: HOSPADM

## 2022-04-09 RX ORDER — ACETAMINOPHEN 325 MG/1
650 TABLET ORAL EVERY 4 HOURS PRN
Status: DISCONTINUED | OUTPATIENT
Start: 2022-04-09 | End: 2022-04-10 | Stop reason: HOSPADM

## 2022-04-09 RX ORDER — DOCUSATE SODIUM 100 MG/1
100 CAPSULE, LIQUID FILLED ORAL DAILY
Status: DISCONTINUED | OUTPATIENT
Start: 2022-04-09 | End: 2022-04-10 | Stop reason: HOSPADM

## 2022-04-09 RX ORDER — FENTANYL CITRATE-0.9 % NACL/PF 10 MCG/ML
100 PLASTIC BAG, INJECTION (ML) INTRAVENOUS EVERY 5 MIN PRN
Status: DISCONTINUED | OUTPATIENT
Start: 2022-04-09 | End: 2022-04-09 | Stop reason: HOSPADM

## 2022-04-09 RX ORDER — MODIFIED LANOLIN
OINTMENT (GRAM) TOPICAL
Status: DISCONTINUED | OUTPATIENT
Start: 2022-04-09 | End: 2022-04-10 | Stop reason: HOSPADM

## 2022-04-09 RX ORDER — ONDANSETRON 4 MG/1
4 TABLET, ORALLY DISINTEGRATING ORAL EVERY 6 HOURS PRN
Status: DISCONTINUED | OUTPATIENT
Start: 2022-04-09 | End: 2022-04-09 | Stop reason: HOSPADM

## 2022-04-09 RX ADMIN — Medication 2 MILLI-UNITS/MIN: at 12:32

## 2022-04-09 RX ADMIN — FENTANYL CITRATE: 0.05 INJECTION, SOLUTION INTRAMUSCULAR; INTRAVENOUS at 12:36

## 2022-04-09 RX ADMIN — PENICILLIN G POTASSIUM 5 MILLION UNITS: 5000000 POWDER, FOR SOLUTION INTRAMUSCULAR; INTRAPLEURAL; INTRATHECAL; INTRAVENOUS at 09:49

## 2022-04-09 RX ADMIN — MISOPROSTOL 25 MCG: 100 TABLET ORAL at 07:16

## 2022-04-09 RX ADMIN — Medication: at 12:36

## 2022-04-09 RX ADMIN — HYDROXYZINE HYDROCHLORIDE 50 MG: 50 TABLET, FILM COATED ORAL at 00:05

## 2022-04-09 RX ADMIN — MISOPROSTOL 25 MCG: 100 TABLET ORAL at 05:00

## 2022-04-09 RX ADMIN — IBUPROFEN 800 MG: 800 TABLET, FILM COATED ORAL at 14:42

## 2022-04-09 RX ADMIN — MISOPROSTOL 25 MCG: 100 TABLET ORAL at 09:12

## 2022-04-09 RX ADMIN — MISOPROSTOL 25 MCG: 100 TABLET ORAL at 02:48

## 2022-04-09 RX ADMIN — IBUPROFEN 800 MG: 800 TABLET ORAL at 20:34

## 2022-04-09 RX ADMIN — SODIUM CHLORIDE, POTASSIUM CHLORIDE, SODIUM LACTATE AND CALCIUM CHLORIDE: 600; 310; 30; 20 INJECTION, SOLUTION INTRAVENOUS at 13:18

## 2022-04-09 RX ADMIN — BUPIVACAINE HYDROCHLORIDE 10 ML: 2.5 INJECTION, SOLUTION EPIDURAL; INFILTRATION; INTRACAUDAL at 12:27

## 2022-04-09 RX ADMIN — BUPIVACAINE HYDROCHLORIDE 25 MG: 2.5 INJECTION, SOLUTION EPIDURAL; INFILTRATION; INTRACAUDAL at 13:00

## 2022-04-09 RX ADMIN — MISOPROSTOL 25 MCG: 100 TABLET ORAL at 00:48

## 2022-04-09 RX ADMIN — DOCUSATE SODIUM 100 MG: 100 CAPSULE, LIQUID FILLED ORAL at 20:35

## 2022-04-09 RX ADMIN — Medication 340 ML/HR: at 14:18

## 2022-04-09 ASSESSMENT — ACTIVITIES OF DAILY LIVING (ADL)
ADLS_ACUITY_SCORE: 5

## 2022-04-09 NOTE — PROGRESS NOTES
[facility] L&D Labor Note    Denzel Tripp MRN# [id number]   Age: 37 year old YOB: 1985           Subjective:     Pt feeling increasing UC pain.  Desires epidural.          Objective:   Patient Vitals for the past 8 hrs:   BP Temp Temp src Resp   22 0710 109/57 97.9  F (36.6  C) Oral 16        Cervical Exam: 3+/80%/-3/Vtx    Membranes: Intact    Fetal Heart Rate: Cat 1    [place]: UC's Q 2-3 min          Assessment:   1)  IUP at 41w1d    2)  Latent phase labor - Cx changed and now favorable.    3)  GBS Pos - on PCN          Plan:   1)  Start Pitocin augmentation.    2)  AROM once station lower    3)  Epidural prn    4)  Antic .      Jeffrey Vázquez MD

## 2022-04-09 NOTE — PLAN OF CARE
Data: Denzel Tripp transferred to [facility] via wheelchair at 1635. Baby transferred via parent's arms.  Action: Receiving unit notified of transfer: Yes. Patient and family notified of room change. Report given to NAEEM Jules at 1635. Belongings sent to receiving unit. Accompanied by Registered Nurse. Oriented patient to surroundings. Call light within reach. ID bands double-checked with receiving RN.  Response: Patient tolerated transfer and is stable.

## 2022-04-09 NOTE — PROVIDER NOTIFICATION
04/09/22 1404   Provider Notification   Provider Name/Title Dr Vázquez   Method of Notification At Bedside   Dr. Vázquez at bedside, reviewed strip. Patient complete and feeling urge to push. Set up for delivery.

## 2022-04-09 NOTE — H&P
No significant change in general health status based on exam of the patient, review of Nursing database and prenatal.     Ms. Denzel Tripp 37 year old  41w0d presents for postdates induction of labor.   Pregnancy is complicated by intrauterine growth restriction that resolved. Also advanced maternal age.     AF, VSS    Cervix on admission is unfavorable     FHT: 140, moderate variability, accels present, no decels  Ono: no contractions    A/P: IUP at 41w0d for postdates induction of labor  -- reviewed with patient induction methods of Cytotec vs Cervidil for cervical ripening given her unfavorable cervix; patient and  choose to proceed with PO Cytotec  -- comfort care measures as needed; patient ultimately desires epidural when she is in pain; will consult anesthesiologist for administration; appreciate coordination of care  -- will proceed with Pitocin and/or AROM when cervix is favorable status  -- continuous fetal monitoring; currently Category 1  -- GBS positive status; will initiate IV penicillin when in active labor   -- anticipate vaginal delivery    Edson Jansen MD  [facility]

## 2022-04-09 NOTE — PROVIDER NOTIFICATION
04/08/22 2048   Provider Notification   Provider Name/Title Dr. Sandhu   Method of Notification At Bedside   Dr. Sandhu at bedside to explain Cytotec and induction process.

## 2022-04-09 NOTE — PROVIDER NOTIFICATION
04/09/22 1128   Provider Notification   Provider Name/Title Dr Vázquez   Method of Notification At Bedside     MD at bedside to perform SVE. 3-4/80/-3. Verbal order to start pitocin induction per protocol. Patient may receive epidural anytime desired.

## 2022-04-09 NOTE — ANESTHESIA PREPROCEDURE EVALUATION
Anesthesia Pre-Procedure Evaluation    Patient: Denzel Tripp   MRN: [id number] : 1985        Procedure :           Past Medical History:   Diagnosis Date     No pertinent past medical history       Past Surgical History:   Procedure Laterality Date     REVISE CIRCUMCISION FEMALE        Allergies   Allergen Reactions     Blood Transfusion Related (Informational Only) Other (See Comments)     Patient has a history of a clinically significant antibody against RBC antigens.  A delay in compatible RBCs may occur.      Social History     Tobacco Use     Smoking status: Never Smoker     Smokeless tobacco: Never Used   Substance Use Topics     Alcohol use: No      Wt Readings from Last 1 Encounters:   22 93 kg (205 lb)        Anesthesia Evaluation   Pt has not had prior anesthetic     No history of anesthetic complications       ROS/MED HX  ENT/Pulmonary:  - neg pulmonary ROS     Neurologic:  - neg neurologic ROS     Cardiovascular:  - neg cardiovascular ROS     METS/Exercise Tolerance:     Hematologic:  - neg hematologic  ROS     Musculoskeletal:       GI/Hepatic:     (+) GERD,     Renal/Genitourinary:       Endo:  - neg endo ROS     Psychiatric/Substance Use:  - neg psychiatric ROS     Infectious Disease:       Malignancy:       Other:     (-) previous  and TOLAC candidate       Physical Exam    Airway        Mallampati: II   TM distance: > 3 FB   Neck ROM: full   Mouth opening: > 3 cm    Respiratory Devices and Support         Dental  no notable dental history         Cardiovascular   cardiovascular exam normal          Pulmonary   pulmonary exam normal                OUTSIDE LABS:  CBC:   Lab Results   Component Value Date    WBC 5.9 2022    WBC 6.1 09/10/2021    HGB 10.6 (L) 2022    HGB 12.2 09/10/2021    HCT 32.2 (L) 2022    HCT 36.1 09/10/2021     2022     09/10/2021     BMP:   Lab Results   Component Value Date     2017     2017     POTASSIUM 3.5 03/21/2017    POTASSIUM 3.2 (L) 03/01/2017    CHLORIDE 106 03/21/2017    CHLORIDE 103 03/01/2017    CO2 28 03/21/2017    CO2 26 03/01/2017    BUN 8 03/21/2017    BUN 5 (L) 03/01/2017    CR 0.59 03/21/2017    CR 0.58 03/01/2017    GLC 66 (L) 03/21/2017     (H) 03/01/2017     COAGS: No results found for: PTT, INR, FIBR  POC:   Lab Results   Component Value Date    HCG Positive (A) 06/14/2013     HEPATIC:   Lab Results   Component Value Date    ALBUMIN 3.9 03/01/2017    PROTTOTAL 8.0 03/01/2017    ALT 25 03/01/2017    AST 20 03/01/2017    ALKPHOS 73 03/01/2017    BILITOTAL 0.4 03/01/2017     OTHER:   Lab Results   Component Value Date    KEYLA 8.6 03/21/2017    LIPASE 325 03/01/2017    TSH 0.69 02/24/2017    T4 1.03 08/24/2016       Anesthesia Plan    ASA Status:  2      Anesthesia Type: Epidural.              Consents    Anesthesia Plan(s) and associated risks, benefits, and realistic alternatives discussed. Questions answered and patient/representative(s) expressed understanding.    - Discussed:     - Discussed with:  Patient         Postoperative Care            Comments:           neg OB ROS.       Neymar Phillips MD

## 2022-04-09 NOTE — ANESTHESIA PROCEDURE NOTES
Epidural catheter Procedure Note    Pre-Procedure   Staff -        Anesthesiologist:  Neymar Phillips MD       Performed By: anesthesiologist       Referred By: Gurpreet       Location: OB       Pre-Anesthestic Checklist: patient identified, IV checked, risks and benefits discussed, informed consent, monitors and equipment checked, pre-op evaluation, at physician/surgeon's request and post-op pain management  Timeout:       Correct Patient: Yes        Correct Procedure: Yes        Correct Site: Yes        Correct Position: Yes   Procedure Documentation  Procedure: epidural catheter       Patient Position: sitting       Patient Prep/Sterile Barriers: sterile gloves, mask, patient draped       Skin prep: Betadine      Local skin infiltrated with mL of 1% lidocaine.        Insertion Site: L2-3. (midline approach).       Technique: LORT saline        KOSTAS at 6 cm.       Needle Type: ToCytoValey needle       Needle Gauge: 17.        Needle Length (Inches): 3.5        Catheter: 19 G.         Catheter threaded easily.         6 cm epidural space.         Threaded 12 cm at skin.        # of attempts: 1 and  # of redirects:     Assessment/Narrative         Paresthesias: No.       Test dose of 3 mL lidocaine 1.5% w/ 1:200,000 epinephrine at 12:24 CDT.         Test dose negative, 3 minutes after injection, for signs of intravascular, subdural, or intrathecal injection.       Insertion/Infusion Method: LORT saline       Aspiration negative for Heme or CSF via Epidural Catheter.    Medication(s) Administered   0.25% Bupivacaine PF (Epidural), 10 mL  Medication Administration Time: 4/9/2022 12:27 PM

## 2022-04-09 NOTE — PLAN OF CARE
Patient transferred to room 432 at 1700 and was admitted by another RN. Clipboard information reviewed. She was instructed to call for staff when needing to get out of bed for the first time. She is attentive to ; breast feeding and supplementing with formula - instructed to give no more than 15 ml.

## 2022-04-09 NOTE — PROVIDER NOTIFICATION
04/08/22 2303   Provider Notification   Provider Name/Title Dr. Sandhu   Method of Notification In Department   Notification Reason Status Update   Dr. Sandhu updated of fetal tacycardia and that patient is currently getting IV bolus.

## 2022-04-09 NOTE — PROVIDER NOTIFICATION
04/09/22 0825   Provider Notification   Provider Name/Title Dr Vázquez   Method of Notification At Bedside   Notification Reason Status Update   MD at bedside to discuss POC with patient and her spouse. Plan to continue cytotec at least one more dose, and recheck cervix 2 hours later, around 1130 or PRN. Patient in agreement with this plan. IV Fentanyl may be given for pain/cramping.

## 2022-04-09 NOTE — PLAN OF CARE
Data: Patient presented to [facility] at cervical ripening.  Reason for maternal/fetal assessment per patient is induction of labor. Patient reports no contractions.  Patient is a .  Prenatal record reviewed. Pregnancy has been uncomplicated thus far.  Gestational Age 41 weeks. VSS. Fetal movement present. Patient denies contractions, backache, abnormal vaginal discharge, pelvic pressure, UTI symptoms, GI problems, bloody show, vaginal bleeding, edema, headache, visual disturbances, epigastric or URQ pain, abdominal pain, rupture of membranes. Support persons is present.   Action: Verbal consent for EFM. Triage assessment completed. Bill of rights reviewed.    Response: Patient verbalized agreement with plan. Patient transferred to room 405 ambulatory, oriented to room and call light.  Will contact MD for plan.

## 2022-04-09 NOTE — L&D DELIVERY NOTE
OB Vaginal Delivery Note    Denzel Tripp MRN# [id number]   Age: 37 year old YOB: 1985       GA: 41w1d  GP:   Labor Complications: None   EBL:   mL  Delivery QBL: 150 mL  Delivery Type: Vaginal, Spontaneous   ROM to Delivery Time: (Delivered) Minutes: 34  [place] Weight: 3.21 kg (7 lb 1.2 oz)    1 Minute 5 Minute 10 Minute   Apgar Totals: 8   9             Delivery Details:  Denzel Tripp, a 37 year old  female delivered a viable infant with apgars of 8  and 9 . Patient was fully dilated and pushing after   hours   minutes in active labor. Delivery was via vaginal, spontaneous  to a sterile field under epidural  anesthesia. Infant delivered in vertex  right  occiput  anterior  position. Anterior and posterior shoulders delivered without difficulty. The cord was clamped, cut twice and 3 vessels  were noted. Cord blood was obtained in routine fashion with the following disposition: lab .      Cord complications: nuchal   Placenta delivered at 2022  2:18 PM . Placental disposition was Hospital disposal . Fundal massage performed and fundus found to be firm.     Episiotomy: none    Perineum, vagina, cervix were inspected, and the following lacerations were noted:   Perineal lacerations: 2nd   periurethral laceration: right             Any lacerations were repaired in the usual fashion using 3-O & 4-O Vicryl.    Excellent hemostasis was noted. Needle count correct. Infant and patient in delivery room in good and stable condition.        Qasim, Female-Denzel [[id number]]    Labor Length    3rd Stage (hrs): 0 (min): 4      Labor Events     labor?: No   steroids: None  Labor Type: Induction/Cervical ripening  Predominate monitoring during 1st stage: continuous electronic fetal monitoring     Antibiotics received during labor?: Yes  Reason for Antibiotics: GBS  Antibiotics received for GBS: Penicillin  Antibiotics Given (GBS): Greater than 4 hours prior to delivery     Rupture  "date/time: 22 1340   Rupture type: Spontaneous rupture of membranes occuring during spontaneous labor or augmentation  Fluid color: Clear  Fluid odor: Normal     Induction: Misoprostol  Induction date/time:     Cervical ripening date/time: 22   Indications for induction: Post-term Gestation     Augmentation: Oxytocin  Indications for augmentation: Ineffective Contraction Pattern  1:1 continuous labor support provided by?: RN       Delivery/Placenta Date and Time    Delivery Date: 22 Delivery Time:  2:14 PM   Placenta Date/Time: 2022  2:18 PM  Oxytocin given at the time of delivery: after delivery of baby  Delivering clinician: Jeffrey Vázquez MD          Vaginal Counts     Initial count performed by 2 team members:  Two Team Members   Marlen Vázquez       Needles Suture Needles Sponges (RETIRED) Instruments   Initial counts 2  5    Added to count       Relief counts       Final counts             Placed during labor Accounted for at the end of labor   FSE NA NA   IUPC NA NA   Cervidil NA NA                     Apgars    Living status: Living   1 Minute 5 Minute 10 Minute 15 Minute 20 Minute   Skin color: 0  1       Heart rate: 2  2       Reflex irritability: 2  2       Muscle tone: 2  2       Respiratory effort: 2  2       Total: 8  9       Apgars assigned by: NAEEM VENTURA     Cord    Vessels: 3 Vessels    Cord Complications: Nuchal   Nuchal Intervention: clamped and cut         Nuchal cord description: tight nuchal cord         Cord Blood Disposition: Lab    Gases Sent?: No    Delayed cord clamping?: Yes    Cord Clamping Delay (seconds): 31-60 seconds    Stem cell collection?: No        Resuscitation    Methods: None      Measurements    Weight: 7 lb 1.2 oz Length: 1' 8.5\"   Head circumference: 34.3 cm       Labor Events and Shoulder Dystocia    Fetal Tracing Prior to Delivery: Category 2  Fetal Tracing Comments: Mild variable decelerations in active phase and second stage " labor.  Shoulder dystocia present?: Neg     Delivery (Maternal) (Provider to Complete) ([id number])    Episiotomy: None  Perineal lacerations: 2nd Repaired?: Yes   Periurethral laceration: right Repaired?: Yes   Repair suture: 3-0 Vicryl, 4-0 Vicryl  Genital tract inspection done: Pos     Blood Loss  Mother: Denzel Tripp #[id number]   Start of Mother's Information    Delivery Blood Loss  04/09/22 0214 - 04/09/22 1433    Delivery QBL (mL) Hospital Encounter 150 mL    Total  150 mL         End of Mother's Information  Mother: Denzel Tripp #[id number]          Delivery - Provider to Complete ([id number])    Delivering clinician: Jeffrey Vázquez MD  Attempted Delivery Types (Choose all that apply): Spontaneous Vaginal Delivery  Delivery Type (Choose the 1 that will go to the Birth History): Vaginal, Spontaneous                                 Placenta    Date/Time: 4/9/2022  2:18 PM  Removal: Spontaneous  Disposition: Hospital disposal           Anesthesia    Method: Epidural  Cervical dilation at placement: 0-3                Presentation and Position    Presentation: Vertex    Position: Right Occiput Anterior                 Jeffrey Vázquez MD

## 2022-04-10 VITALS
TEMPERATURE: 98.4 F | HEART RATE: 73 BPM | HEIGHT: 68 IN | WEIGHT: 205 LBS | DIASTOLIC BLOOD PRESSURE: 66 MMHG | RESPIRATION RATE: 18 BRPM | BODY MASS INDEX: 31.07 KG/M2 | OXYGEN SATURATION: 99 % | SYSTOLIC BLOOD PRESSURE: 110 MMHG

## 2022-04-10 PROCEDURE — 999N000079 HC STATISTIC IP LACTATION SERVICES 1-15 MIN

## 2022-04-10 PROCEDURE — 250N000013 HC RX MED GY IP 250 OP 250 PS 637: Performed by: OBSTETRICS & GYNECOLOGY

## 2022-04-10 RX ADMIN — IBUPROFEN 800 MG: 800 TABLET ORAL at 03:00

## 2022-04-10 RX ADMIN — DOCUSATE SODIUM 100 MG: 100 CAPSULE, LIQUID FILLED ORAL at 08:16

## 2022-04-10 RX ADMIN — IBUPROFEN 800 MG: 800 TABLET ORAL at 12:06

## 2022-04-10 ASSESSMENT — ACTIVITIES OF DAILY LIVING (ADL)
ADLS_ACUITY_SCORE: 5

## 2022-04-10 NOTE — ANESTHESIA POSTPROCEDURE EVALUATION
Patient: Denzel Tripp      S/P epidural for labor.   I or my partner was immediately available for management of this patient during epidural analgesia infusion.  VSS.  Doing well. Block resolved.  Neuro at baseline. Denies positional headache. Minimal side effects easily managed w/ PRN meds. No apparent anesthetic complications. No follow-up required.    Neymar Phillips MD    Note:  Anesthesia Post Evaluation    Last vitals:  Vitals Value Taken Time   BP     Temp     Pulse     Resp     SpO2         Electronically Signed By: Neymar Phillips MD  April 10, 2022  11:41 AM

## 2022-04-10 NOTE — DISCHARGE SUMMARY
"Postpartum Progress Note  Denzel Tripp  [id number]    Subjective:   Patiet doing well. Pain well controlled with POs. Denies nausea and vomiting. Ambulating without difficulty. Adequate PO intake. Breast feeding and bottle feeding.     Objective:  /66 (BP Location: Right arm, Cuff Size: Adult Regular)   Pulse 73   Temp 98.4  F (36.9  C) (Oral)   Resp 18   Ht 1.715 m (5' 7.5\")   Wt 93 kg (205 lb)   LMP 2021   SpO2 99%   Breastfeeding Unknown   BMI 31.63 kg/m    General: resting comfortably, in NAD  Heart: regular rate, well perfused  Lungs: non-labored breathing, no cough  Abdomen: soft, non distended, appropriately tender to palpation, FF at U -1  Extremities: scant edema, non-tender to palpation    Labs:  Hemoglobin   Date Value Ref Range Status   2022 10.6 (L) 11.7 - 15.7 g/dL Final   09/10/2021 12.2 11.7 - 15.7 g/dL Final   2017 13.2 11.7 - 15.7 g/dL Final   2016 13.9 11.7 - 15.7 g/dL Final       Assessment/Plan:  37 year old  who is PPD#1 s/p .  Currently stable and doing well  - Routine post-partum cares  - Heme: no s/sx ABLA. Continue PO iron at home  - Pain: continue tylenol, ibuprofen, ice packs, hot packs as needed  - Baby: in room doing well  - Dispo: d/c to home today    Denzel Tripp was discharged to home in stable condition with the following instructions/medications:  1) Call for temperature > 100.4, foul smelling vaginal discharge, bleeding > 1 pad per hour x 2 hrs, pain not controlled by oral pain meds, thoughts of self-harm or harming the infant.  2) Contraception counseling was provided.  3) She was instructed to follow-up with her primary OB in 6 weeks for a routine postpartum visit, and in 1 week for a blood pressure check if having any blood pressure issues while hospitalized.  4) She was instructed to continue her PNV on discharge if she wishes to breast feed her infant.  5) She was discharged home with recommendations to alternate ibuprofen and " tylenol for pain as well as tub soaks and ice pads for perineal discomfort.      Gill Mendez MD  OB/GYN

## 2022-04-10 NOTE — PLAN OF CARE
Patient is vitally stable. Up independently. Voiding without difficulty. Reports adequate pain control. Breast and bottle feeding independently. Bonding well with infant. FOB at bedside and supportive. Discharge education completed, criteria met. Discharged this evening.

## 2022-04-10 NOTE — LACTATION NOTE
Lactation visit. Mother reports breastfeeding has been going well. She  her other children and reports not having an issue with milk supply. Made aware of lactation availability and encouraged to call as needed. No additional questions at this time.

## 2022-04-10 NOTE — PLAN OF CARE
VSS. Pain controlled with ibuprofen. Tylenol declined. Breast feeding independently without difficulty. Fundus firm. Bleeding scant. Laceration within normal limits. Bonding well with infant. Alone with baby throughout the night. Plan to discharge home this evening or the following morning. Sia Pascual RN on 4/10/2022 at 5:01 AM

## 2022-04-10 NOTE — PLAN OF CARE
Patient meeting expected goals. Is up independent in room, meeting all personal and infant needs. VSS. Pain is being managed with Ibuprofen. Brought in Tucks and ice packs for periurethral laceration; but states it is not painful, nor are her hemorrhoids.  Voiding without difficulty. Patient is both breast and bottle feeding infant formula. Encouraged offering breast prior to formula.

## 2022-04-10 NOTE — PLAN OF CARE
Patient doing well this shift. VSS. Voiding without difficulty. Eating and drinking, tolerating regular diet well. Independent with self and  cares. Positive attachment behaviors observed with infant.

## 2022-06-01 NOTE — LETTER
3/1/2017    Indra Wilkinson PA-C  [facility]  [address] [facility] [place] [address]    RE: Denzel Tripp       Dear Colleague,    I had the pleasure of seeing  Denzel Tripp in Cardiology Clinic for palpitations.  Denzel Tripp is a 31-year-old [place] woman.  She is 3 months postpartum.  She has 4 kids.  Over the last 3-4 days she has had palpitations in the form of heart racing or feeling her heart beating strong, especially when she is laying down at night to sleep.  This is bothering her enough that she is not able to sleep.  In addition, she is not sleeping because she has to take care of her baby.  The palpitations are fairly regular.  This was concerning enough to her that she saw you recently and was referred to me.  She had some palpitations last March and April and had a ZIO Patch monitor for 24 hours which showed sinus rhythm with occasional PACs and PVCs.  She drinks 1 cup of tea 2-3 times a week.      There is no shortness of breath, no orthopnea or PND, no edema of feet.  She has occasional burning in the epigastric and left inframammary region off and on over the last 2 days associated with burping and belching.  It is nonexertional.  This was evaluated by you and you started her on Prilosec which she has been taking for 2 days.      Because of the palpitations, she went to the emergency room yesterday and she had a TSH which was normal.  No abnormality was found and she was discharged on potassium; however, it was slightly low at 3.2 and apparently was replaced.      PHYSICAL EXAMINATION:  Blood pressure 108/80, pulse 84 per minute and regular.  Cardiopulmonary exam unremarkable.     Outpatient Encounter Prescriptions as of 3/1/2017   Medication Sig Dispense Refill     omeprazole (PRILOSEC) 20 MG CR capsule Take 1 capsule (20 mg) by mouth daily 30 capsule 1     acetaminophen (TYLENOL) 325 MG tablet Take 2 tablets (650 mg) by mouth every 4 hours as needed for mild pain or fever  Called pt   Gave pt referral information   Thank you (greater than or equal to 38  C /100.4  F (oral) or 38.5  C/ 101.4  F (core).) 100 tablet      Prenatal Vit-Fe Fumarate-FA (PRENATAL VITAMIN PO) Take 1 tablet by mouth       [DISCONTINUED] norelgestromin-ethinyl estradiol (ORTHO EVRA) 150-35 MCG/24HR patch Place 1 patch onto the skin once a week 9 patch 3     [DISCONTINUED] ibuprofen (ADVIL,MOTRIN) 200 MG tablet Take 3 tablets (600 mg) by mouth every 6 hours as needed for other (cramping)       [DISCONTINUED] ranitidine (ZANTAC) 300 MG tablet Take 1 tablet (300 mg) by mouth At Bedtime 30 tablet 1     No facility-administered encounter medications on file as of 3/1/2017.       IMPRESSION:   1.  Palpitations.  At this time, I am not sure if she is having any arrhythmia or if she is more sensitive of her heartbeats.  She seems anxious and has not slept in several days because of her new baby.  Yesterday she was given IV Ativan and felt much better in the emergency room according to the patient and her .  There may be an element of anxiety here but we should rule out any arrhythmia.  Since she is having constant palpitations for the last few days, I think a Holter as an initial step would be a reasonable option.  I would also recommend echocardiogram to rule out structural heart disease.  I have asked her to cut down caffeine.  I will have her see my nurse practitioner at [facility] after that.  If there are no significant arrhythmias and echocardiogram seems normal, that would be reassuring, but if required, we may consider doing a low-dose beta blocker for symptomatic purposes.  She tells me that she will stop breastfeeding in a week, and if that is the case, I think it will be more safe for her to use beta blockers.  I also feel that treating anxiety would help if it is present.  I have asked them to call Indra Wilkinson and discuss that further.  She did feel better with the Ativan  yesterday.   2.  Heartburn.  This appears more like gastroesophageal reflux  disease.  She started recently on Prilosec.  I would try that for a few days and see if this gets better.  If it persists, we may consider doing a stress treadmill test.  EKG done yesterday was essentially normal and reviewed by me.   3.  Mild hypokalemia.  This was replaced yesterday in the emergency room and I advised them to continue to replenish potassium by taking high potassium-containing foods like bananas.  This may have to be rechecked as an outpatient.  It can be done by my nurse practitioner along with magnesium levels.      Thank you for allowing us to participate in the care of this nice patient.  We will see her back in followup after the echo and Holter is completed.     Sincerely,    Casey Haynes MD     [facility]

## 2022-06-02 ENCOUNTER — PRENATAL OFFICE VISIT (OUTPATIENT)
Dept: OBGYN | Facility: CLINIC | Age: 37
End: 2022-06-02
Payer: COMMERCIAL

## 2022-06-02 VITALS
DIASTOLIC BLOOD PRESSURE: 80 MMHG | BODY MASS INDEX: 30.77 KG/M2 | SYSTOLIC BLOOD PRESSURE: 120 MMHG | WEIGHT: 203 LBS | HEIGHT: 68 IN

## 2022-06-02 PROCEDURE — 99207 PR POST PARTUM EXAM: CPT | Performed by: FAMILY MEDICINE

## 2022-06-02 ASSESSMENT — PATIENT HEALTH QUESTIONNAIRE - PHQ9: SUM OF ALL RESPONSES TO PHQ QUESTIONS 1-9: 0

## 2022-06-02 NOTE — NURSING NOTE
"Chief Complaint   Patient presents with     Post Partum Exam     Pap not due       Initial /80   Ht 1.715 m (5' 7.5\")   Wt 92.1 kg (203 lb)   LMP 2021   BMI 31.33 kg/m   Estimated body mass index is 31.33 kg/m  as calculated from the following:    Height as of this encounter: 1.715 m (5' 7.5\").    Weight as of this encounter: 92.1 kg (203 lb).  BP completed using cuff size: regular    Questioned patient about current smoking habits.  Pt. has never smoked.          The following HM Due: NONE      "

## 2022-06-02 NOTE — PATIENT INSTRUCTIONS
Return yearly for exams     Dr. Jessika James, DO    Obstetrics and Gynecology  [facility] [place] [facility] [place]

## 2022-06-02 NOTE — PROGRESS NOTES
"SUBJECTIVE: Denzel is here for a 6-week postpartum checkup.    Delivery date was 2022. She had a  of a viable girl, weight 7 pounds 1.2 oz., with no complications.  Since delivery, she has been breast feeding and supplement.  She has no signs of infection, bleeding or other complications.  She is not pregnant.  We discussed contraceptions and she has chosen condoms.  She  has not had intercourse since delivery and complains of No discomfort. Patient screened for postpartum depression and complaints are NEGATIVE. Screening has also been completed for intimate partner violence.    EXAM:  Today's Depression Rating was   PHQ-9 SCORE 2022   PHQ-9 Total Score -   PHQ-9 Total Score 0       /80   Ht 1.715 m (5' 7.5\")   Wt 92.1 kg (203 lb)   LMP 2021   BMI 31.33 kg/m     GENERAL healthy, alert and no distress  EYES EOMI, intact visual fields, PERRL and funduscopic deferred  HENT: Normocephalic. TM's grossly normal, oropharynx without significant findings.  NECK: no adenopathy, no asymmetry, masses, or scars and thyroid normal to palpation  RESP: Clear to auscultation  BREASTS: NEGATIVE  CV: NEGATIVE  LYMPH: NEGATIVE  GI: aorta normal and bowel sounds normal  : no hernia detected  GYN PELVIC: NEGATIVE, normal external genitalia, normal groin lymphatics, normal urethral meatus, normal vaginal mucosa, normal cervix and normal adnexa, no masses or tenderness  MS: NEGATIVE, Extremities normal. No deformaties, edema or skin discoloration.  SKIN: no ulcers, lesions or rash  NEURO: alert/oriented to person, location and time, CN 2-12 intact, strength 5/5 throughout and symmetric, sensation to light touch grossly intact throughout  PSYCH: NEGATIVE    Discussed risks and benefits of contraception options in detail including oral contraceptive pills, injection, IUD, ring and sterilization. Patient elects condoms.  No contraindications noted.     Patient did not have GDM:    ASSESSMENT:   Normal postpartum " exam after .    PLAN:  Return as needed or at time of next expected pap, pelvic, or breast exam.    Dr. Jessika James, DO    Obstetrics and Gynecology  [facility] [place] [facility] [place]

## 2024-04-20 ENCOUNTER — OFFICE VISIT (OUTPATIENT)
Dept: URGENT CARE | Facility: URGENT CARE | Age: 39
End: 2024-04-20
Payer: COMMERCIAL

## 2024-04-20 VITALS
OXYGEN SATURATION: 97 % | SYSTOLIC BLOOD PRESSURE: 113 MMHG | WEIGHT: 191 LBS | BODY MASS INDEX: 29.47 KG/M2 | TEMPERATURE: 100.7 F | DIASTOLIC BLOOD PRESSURE: 77 MMHG | HEART RATE: 110 BPM | RESPIRATION RATE: 24 BRPM

## 2024-04-20 DIAGNOSIS — R50.9 FEVER IN ADULT: ICD-10-CM

## 2024-04-20 DIAGNOSIS — J02.9 SORE THROAT: ICD-10-CM

## 2024-04-20 DIAGNOSIS — J02.0 STREP THROAT: Primary | ICD-10-CM

## 2024-04-20 LAB — DEPRECATED S PYO AG THROAT QL EIA: POSITIVE

## 2024-04-20 PROCEDURE — 87880 STREP A ASSAY W/OPTIC: CPT | Performed by: PHYSICIAN ASSISTANT

## 2024-04-20 PROCEDURE — 99213 OFFICE O/P EST LOW 20 MIN: CPT | Performed by: PHYSICIAN ASSISTANT

## 2024-04-20 RX ORDER — AMOXICILLIN 500 MG/1
500 CAPSULE ORAL 2 TIMES DAILY
Qty: 20 CAPSULE | Refills: 0 | Status: SHIPPED | OUTPATIENT
Start: 2024-04-20 | End: 2024-04-20

## 2024-04-20 RX ORDER — AMOXICILLIN 500 MG/1
500 CAPSULE ORAL 2 TIMES DAILY
Qty: 20 CAPSULE | Refills: 0 | Status: SHIPPED | OUTPATIENT
Start: 2024-04-20 | End: 2024-04-30

## 2024-04-20 NOTE — PATIENT INSTRUCTIONS
April 20, 2024 Urgent Care Plan         - Amoxicillin antibiotic for Strep   -Home supportive care   -Ok to alternate weight  Ibuprofen 400 mg  and Tylenol  650 mg (switch from one to the other every 4 hours) for the next couple of days, as needed for sore throat and fever  -Encourage extra fluids   -Follow-up with primary care or urgent care if no improvement after 3-4 days of antibiotics, if not fully resolved in 10 days, and sooner if worsening.   -Change toothbrush as discussed to prevent re-infection

## 2024-04-20 NOTE — PROGRESS NOTES
ASSESSMENT/PLAN:     (J02.0) Strep throat  (primary encounter diagnosis)  Plan: amoxicillin (AMOXIL) 500 MG capsule,         DISCONTINUED: amoxicillin (AMOXIL) 500 MG         capsule          AVS/Plan     April 20, 2024 Urgent Care Plan         - Amoxicillin antibiotic for Strep   -Home supportive care   -Ok to alternate weight  Ibuprofen 400 mg  and Tylenol  650 mg (switch from one to the other every 4 hours) for the next couple of days, as needed for sore throat and fever  -Encourage extra fluids   -Follow-up with primary care or urgent care if no improvement after 3-4 days of antibiotics, if not fully resolved in 10 days, and sooner if worsening.   -Change toothbrush as discussed to prevent re-infection     -Educational handout (in [facility]) is also provided     (R50.9) Fever in adult    (J02.9) Sore throat  Plan: Streptococcus A Rapid Screen w/Reflex to PCR            This progress note has been dictated, with use of voice recognition software. Any grammatical, typographical, or context errors are unintentional and inherent to use of voice recognition software.  --------------------------------    Chief Complaint   Patient presents with    Fever     Requested no - son will help- fever started yesterday- not confirmed with a thermometer just feels hot    Pharyngitis     Son had strep throat two weeks ago     Generalized Body Aches     OF NOTE: Patient was offered, but declined, professional [facility]  today     SUBJECTIVE:     Denzel KEY Qasim 39 year old female who presents to  today for evaluation of acute onsets sore throat that developed yesterday and acute onset fever and generalized body aches that developed last night.    Patient confirms she is still able to take in good fluids and soft food despite sore throat.      Illness Contact: Positive for household Strep contact (child)          ROS:   CONSITUTIONAL: No fever or chills. Positive malaise. No severe fatigue (still able to do  all self  cares)  HEENT: Positive sore throat as per above HPI. No difficulty talking, swallowing, opening mouth or breathing upon questioning today.   No nasal congestion. No other ENT sxs.   RESP: No acute onset cough, wheezing or shortness of breath   GI: No acute onset nausea, vomiting or diarrhea. No abdominal pain.   SKIN: No acute rash or hives    NEURO: No acute headaches, neck stiffness, photophobia, rash, mental status changes or lethargy.   RHEUM: Positive for waxing and waning generalized body aches. No muscle weakness. No red, hot, swollen joints.         Past Medical History:   Diagnosis Date    No pertinent past medical history        Patient Active Problem List   Diagnosis    CARDIOVASCULAR SCREENING; LDL GOAL LESS THAN 160    Vaginal delivery    Amniotic fluid leaking    Vitamin D deficiency    Ganglion cyst    Palpitations    Gastroesophageal reflux disease, esophagitis presence not specified    Multigravida of advanced maternal age, delivered    Breastfeeding problem in        Current Outpatient Medications   Medication Sig Dispense Refill    amoxicillin (AMOXIL) 500 MG capsule Take 1 capsule (500 mg) by mouth 2 times daily for 10 days 20 capsule 0    acetaminophen (TYLENOL) 325 MG tablet Take 325-650 mg by mouth as needed for mild pain  (Patient not taking: Reported on 2022)       No current facility-administered medications for this visit.         Allergies   Allergen Reactions    Blood Transfusion Related (Informational Only) Other (See Comments)     Patient has a history of a clinically significant antibody against RBC antigens.  A delay in compatible RBCs may occur.           OBJECTIVE:  /77   Pulse 110   Temp (!) 100.7  F (38.2  C) (Tympanic)   Resp 24   Wt 86.6 kg (191 lb)   LMP  (LMP Unknown)   SpO2 97%   Breastfeeding Unknown   BMI 29.47 kg/m              General appearance: alert and no apparent distress  Skin color is uniform in color and without rash.  HEENT:    Conjunctiva not injected.  Sclera clear.  Left TM is normal: no effusions, no erythema, and normal landmarks.  Right TM is normal: no effusions, no erythema, and normal landmarks.  Nasal mucosa is normal.  Oropharyngeal exam is positive for moderate, generalized, posterior pharyngeal erythema.  No asymmetry. Uvula is midline. No trismus. Voice is clear. No lesions, adenopathy, plaque or exudate.  Neck is supple, FROM. No neck stiffness. No adenopathy  CARDIAC:NORMAL - regular rate and rhythm without murmur.  RESP: No increased work of breathing. Lung fields are clear to ausculation. No rales, rhonchi, or wheezing.  NEURO: Alert and oriented.  Normal speech and mentation.  CN II/XII grossly intact.  Gait within normal limits.          LAB:      Results for orders placed or performed in visit on 04/20/24   Streptococcus A Rapid Screen w/Reflex to PCR     Status: Abnormal    Specimen: Throat; Swab   Result Value Ref Range    Group A Strep antigen Positive (A) Negative

## 2024-06-07 NOTE — NURSING NOTE
Left message for patient--advised he is scheduled for device check 10/9 same day he is scheduled with Dr. Romero.  Direct line given should he have any further questions.    Nolan  used for [facility] visit via Ipad, ID#[id number]